# Patient Record
Sex: MALE | Race: WHITE | HISPANIC OR LATINO | Employment: FULL TIME | URBAN - METROPOLITAN AREA
[De-identification: names, ages, dates, MRNs, and addresses within clinical notes are randomized per-mention and may not be internally consistent; named-entity substitution may affect disease eponyms.]

---

## 2017-03-29 ENCOUNTER — ALLSCRIPTS OFFICE VISIT (OUTPATIENT)
Dept: OTHER | Facility: OTHER | Age: 57
End: 2017-03-29

## 2017-03-29 DIAGNOSIS — E88.09 OTHER DISORDERS OF PLASMA-PROTEIN METABOLISM, NOT ELSEWHERE CLASSIFIED: ICD-10-CM

## 2017-03-29 DIAGNOSIS — I10 ESSENTIAL (PRIMARY) HYPERTENSION: ICD-10-CM

## 2017-03-29 DIAGNOSIS — K58.9 IRRITABLE BOWEL SYNDROME WITHOUT DIARRHEA: ICD-10-CM

## 2017-03-29 DIAGNOSIS — R31.9 HEMATURIA: ICD-10-CM

## 2017-03-29 DIAGNOSIS — R94.5 ABNORMAL RESULTS OF LIVER FUNCTION STUDIES: ICD-10-CM

## 2017-03-29 DIAGNOSIS — K44.9 DIAPHRAGMATIC HERNIA WITHOUT OBSTRUCTION OR GANGRENE: ICD-10-CM

## 2017-03-29 DIAGNOSIS — F41.9 ANXIETY DISORDER: ICD-10-CM

## 2017-03-29 DIAGNOSIS — R74.8 ABNORMAL LEVELS OF OTHER SERUM ENZYMES: ICD-10-CM

## 2017-03-29 DIAGNOSIS — Z12.5 ENCOUNTER FOR SCREENING FOR MALIGNANT NEOPLASM OF PROSTATE: ICD-10-CM

## 2017-03-29 DIAGNOSIS — Z12.11 ENCOUNTER FOR SCREENING FOR MALIGNANT NEOPLASM OF COLON: ICD-10-CM

## 2018-01-12 VITALS
SYSTOLIC BLOOD PRESSURE: 134 MMHG | DIASTOLIC BLOOD PRESSURE: 88 MMHG | HEART RATE: 76 BPM | HEIGHT: 62 IN | WEIGHT: 174 LBS | BODY MASS INDEX: 32.02 KG/M2

## 2018-04-19 ENCOUNTER — OFFICE VISIT (OUTPATIENT)
Dept: FAMILY MEDICINE CLINIC | Facility: CLINIC | Age: 58
End: 2018-04-19
Payer: COMMERCIAL

## 2018-04-19 VITALS
DIASTOLIC BLOOD PRESSURE: 80 MMHG | WEIGHT: 170 LBS | HEIGHT: 62 IN | BODY MASS INDEX: 31.28 KG/M2 | HEART RATE: 72 BPM | SYSTOLIC BLOOD PRESSURE: 130 MMHG

## 2018-04-19 DIAGNOSIS — M25.511 CHRONIC RIGHT SHOULDER PAIN: ICD-10-CM

## 2018-04-19 DIAGNOSIS — I10 ESSENTIAL HYPERTENSION: Primary | ICD-10-CM

## 2018-04-19 DIAGNOSIS — R79.89 ELEVATED LIVER FUNCTION TESTS: ICD-10-CM

## 2018-04-19 DIAGNOSIS — G89.29 CHRONIC RIGHT SHOULDER PAIN: ICD-10-CM

## 2018-04-19 DIAGNOSIS — G47.00 INSOMNIA, UNSPECIFIED TYPE: ICD-10-CM

## 2018-04-19 DIAGNOSIS — Z12.11 SCREEN FOR COLON CANCER: ICD-10-CM

## 2018-04-19 DIAGNOSIS — Z12.5 SCREENING FOR PROSTATE CANCER: ICD-10-CM

## 2018-04-19 DIAGNOSIS — F41.9 ANXIETY: ICD-10-CM

## 2018-04-19 PROBLEM — M25.50 MULTIPLE JOINT PAIN: Status: ACTIVE | Noted: 2018-04-19

## 2018-04-19 PROBLEM — F32.A MILD DEPRESSION: Status: ACTIVE | Noted: 2017-03-29

## 2018-04-19 PROCEDURE — 93000 ELECTROCARDIOGRAM COMPLETE: CPT | Performed by: FAMILY MEDICINE

## 2018-04-19 PROCEDURE — 99214 OFFICE O/P EST MOD 30 MIN: CPT | Performed by: FAMILY MEDICINE

## 2018-04-19 RX ORDER — NAPROXEN 500 MG/1
500 TABLET ORAL 2 TIMES DAILY WITH MEALS
Qty: 30 TABLET | Refills: 1 | Status: SHIPPED | OUTPATIENT
Start: 2018-04-19 | End: 2018-06-18

## 2018-04-19 RX ORDER — HYDROXYZINE HYDROCHLORIDE 10 MG/1
TABLET, FILM COATED ORAL
Qty: 60 TABLET | Refills: 1 | Status: SHIPPED | OUTPATIENT
Start: 2018-04-19 | End: 2018-06-18

## 2018-04-19 RX ORDER — IBUPROFEN 200 MG
TABLET ORAL EVERY 6 HOURS PRN
COMMUNITY
End: 2018-04-19 | Stop reason: ALTCHOICE

## 2018-04-19 RX ORDER — LISINOPRIL AND HYDROCHLOROTHIAZIDE 20; 12.5 MG/1; MG/1
1 TABLET ORAL DAILY
COMMUNITY
Start: 2011-06-29 | End: 2018-04-19 | Stop reason: SDUPTHER

## 2018-04-19 RX ORDER — LISINOPRIL AND HYDROCHLOROTHIAZIDE 20; 12.5 MG/1; MG/1
1 TABLET ORAL DAILY
Qty: 90 TABLET | Refills: 1 | Status: SHIPPED | OUTPATIENT
Start: 2018-04-19 | End: 2020-01-31 | Stop reason: SDUPTHER

## 2018-04-19 NOTE — PROGRESS NOTES
Assessment/Plan:    No problem-specific Assessment & Plan notes found for this encounter  Diagnoses and all orders for this visit:    Essential hypertension  -     lisinopril-hydrochlorothiazide (PRINZIDE,ZESTORETIC) 20-12 5 MG per tablet; Take 1 tablet by mouth daily  -     CBC and differential; Future  -     Comprehensive metabolic panel; Future  -     Lipid Panel with Direct LDL reflex; Future  -     TSH, 3rd generation; Future  -     KAROLINA Screen w/ Reflex to Titer/Pattern; Future  -     Lyme Antibody Profile with reflex to WB; Future  -     RF Screen w/ Reflex to Titer; Future  -     Sedimentation rate, automated; Future  -     POCT ECG    Anxiety  -     CBC and differential; Future  -     Comprehensive metabolic panel; Future  -     Lipid Panel with Direct LDL reflex; Future  -     TSH, 3rd generation; Future  -     KAROLINA Screen w/ Reflex to Titer/Pattern; Future  -     Lyme Antibody Profile with reflex to WB; Future  -     RF Screen w/ Reflex to Titer; Future  -     Sedimentation rate, automated; Future    Elevated liver function tests  -     CBC and differential; Future  -     Comprehensive metabolic panel; Future  -     Lipid Panel with Direct LDL reflex; Future  -     TSH, 3rd generation; Future  -     KAROLINA Screen w/ Reflex to Titer/Pattern; Future  -     Lyme Antibody Profile with reflex to WB; Future  -     RF Screen w/ Reflex to Titer; Future  -     Sedimentation rate, automated; Future    Insomnia, unspecified type  -     CBC and differential; Future  -     Comprehensive metabolic panel; Future  -     Lipid Panel with Direct LDL reflex; Future  -     TSH, 3rd generation; Future  -     KAROLINA Screen w/ Reflex to Titer/Pattern; Future  -     Lyme Antibody Profile with reflex to WB; Future  -     RF Screen w/ Reflex to Titer; Future  -     Sedimentation rate, automated; Future  -     hydrOXYzine HCL (ATARAX) 10 mg tablet;  One or 2 tablets at bedtime    Chronic right shoulder pain  -     CBC and differential; Future  -     Comprehensive metabolic panel; Future  -     Lipid Panel with Direct LDL reflex; Future  -     TSH, 3rd generation; Future  -     KAROLINA Screen w/ Reflex to Titer/Pattern; Future  -     Lyme Antibody Profile with reflex to WB; Future  -     RF Screen w/ Reflex to Titer; Future  -     Sedimentation rate, automated; Future  -     POCT ECG  -     naproxen (NAPROSYN) 500 mg tablet; Take 1 tablet (500 mg total) by mouth 2 (two) times a day with meals    Screen for colon cancer  -     Occult Bloood,Fecal Immunochemical; Future    Screening for prostate cancer  -     PSA, total and free; Future    Other orders  -     Discontinue: lisinopril-hydrochlorothiazide (PRINZIDE,ZESTORETIC) 20-12 5 MG per tablet; Take 1 tablet by mouth daily  -     Discontinue: ibuprofen (MOTRIN) 200 mg tablet; Take by mouth every 6 (six) hours as needed for mild pain          Subjective:      Patient ID: Codi Castellano is a 56 y o  male  CC:  B / L  Shoulder pain - worse in left and pt is concerned that sometimes he has pain upper left side of chest   No SOB, no nausea  Symptoms have been present for 3 weeks  No specific injury  Pt also notes that he works nights and has trouble sleeping during the day  Previous sleep medications that were prescribed were no help  -  [facility]    HPI:  This is a 53-year-old male who comes in with symptoms of left shoulder/left upper chest pain for the past 3 days  The pain will came up from a sound sleep  Does not have any associated shortness of breath or radiation of the pain down the arm or to the jaw  The pain is intermittent and lasts approximately 2 seconds  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 and feels that the left chest/shoulder pain is the results of repetitive circular motion so of driving the forklift  He is also having some left hand pain which makes it difficult to grasp objects  His knees have been bothering him    He is also having some insomnia problems  He was given trazodone in the past and through the tablets away because he said they did not help the insomnia problem  His blood pressure is 130/80  He has not had lab work for 4 years and will be given lab requisitions today and brought back in several weeks to check the progress of his shoulder pain  The following portions of the patient's history were reviewed and updated as appropriate: allergies, current medications, past family history, past medical history, past social history, past surgical history and problem list     Review of Systems   Constitutional: Negative  HENT: Negative  Eyes: Negative  Respiratory: Negative  Cardiovascular: Negative  Gastrointestinal: Negative  Endocrine: Negative  Genitourinary: Negative  Musculoskeletal: Positive for arthralgias  Left upper chest/shoulder pain   Skin: Negative  Allergic/Immunologic: Negative  Neurological: Negative  Hematological: Negative  Psychiatric/Behavioral: Negative  Vitals:    04/19/18 1016   BP: 130/80   BP Location: Left arm   Patient Position: Sitting   Cuff Size: Large   Pulse: 72   Weight: 77 1 kg (170 lb)   Height: 5' 2" (1 575 m)   Objective:      /80 (BP Location: Left arm, Patient Position: Sitting, Cuff Size: Large)   Pulse 72   Ht 5' 2" (1 575 m)   Wt 77 1 kg (170 lb)   BMI 31 09 kg/m²          Physical Exam   Constitutional: He is oriented to person, place, and time  He appears well-developed and well-nourished  HENT:   Head: Normocephalic  Right Ear: External ear normal    Left Ear: External ear normal    Mouth/Throat: Oropharynx is clear and moist    Eyes: Conjunctivae and EOM are normal  Pupils are equal, round, and reactive to light  Neck: Normal range of motion  Neck supple  Cardiovascular: Normal rate, regular rhythm and normal heart sounds  Pulmonary/Chest: Effort normal and breath sounds normal    Abdominal: Soft   Bowel sounds are normal    Musculoskeletal: He exhibits tenderness  He exhibits no edema or deformity  Tenderness to palpation anterior aspect of left shoulder  No limitation of motion of arm, good  strength  Neurological: He is alert and oriented to person, place, and time  Skin: Skin is warm  Psychiatric: He has a normal mood and affect  His behavior is normal  Judgment and thought content normal    Nursing note and vitals reviewed

## 2018-04-23 LAB
ALBUMIN SERPL-MCNC: 4.3 G/DL (ref 3.6–5.1)
ALBUMIN/GLOB SERPL: 1.6 (CALC) (ref 1–2.5)
ALP SERPL-CCNC: 106 U/L (ref 40–115)
ALT SERPL-CCNC: 31 U/L (ref 9–46)
ANA PAT SER IF-IMP: ABNORMAL
ANA SER QL IF: POSITIVE
ANA TITR SER IF: ABNORMAL TITER
AST SERPL-CCNC: 23 U/L (ref 10–35)
B BURGDOR IGG SER QL IB: NEGATIVE
B BURGDOR IGM SER QL IB: NEGATIVE
B BURGDOR18KD IGG SER QL IB: ABNORMAL
B BURGDOR23KD IGG SER QL IB: ABNORMAL
B BURGDOR23KD IGM SER QL IB: ABNORMAL
B BURGDOR28KD IGG SER QL IB: ABNORMAL
B BURGDOR30KD IGG SER QL IB: ABNORMAL
B BURGDOR39KD IGG SER QL IB: ABNORMAL
B BURGDOR39KD IGM SER QL IB: ABNORMAL
B BURGDOR41KD IGG SER QL IB: REACTIVE
B BURGDOR41KD IGM SER QL IB: ABNORMAL
B BURGDOR45KD IGG SER QL IB: ABNORMAL
B BURGDOR58KD IGG SER QL IB: ABNORMAL
B BURGDOR66KD IGG SER QL IB: ABNORMAL
B BURGDOR93KD IGG SER QL IB: ABNORMAL
BASOPHILS # BLD AUTO: 20 CELLS/UL (ref 0–200)
BASOPHILS NFR BLD AUTO: 0.3 %
BILIRUB SERPL-MCNC: 0.9 MG/DL (ref 0.2–1.2)
BUN SERPL-MCNC: 19 MG/DL (ref 7–25)
BUN/CREAT SERPL: NORMAL (CALC) (ref 6–22)
CALCIUM SERPL-MCNC: 9.4 MG/DL (ref 8.6–10.3)
CHLORIDE SERPL-SCNC: 105 MMOL/L (ref 98–110)
CHOLEST SERPL-MCNC: 191 MG/DL
CHOLEST/HDLC SERPL: 4.5 (CALC)
CO2 SERPL-SCNC: 28 MMOL/L (ref 20–31)
CREAT SERPL-MCNC: 1.16 MG/DL (ref 0.7–1.33)
EOSINOPHIL # BLD AUTO: 79 CELLS/UL (ref 15–500)
EOSINOPHIL NFR BLD AUTO: 1.2 %
ERYTHROCYTE [DISTWIDTH] IN BLOOD BY AUTOMATED COUNT: 13.1 % (ref 11–15)
ERYTHROCYTE [SEDIMENTATION RATE] IN BLOOD BY WESTERGREN METHOD: 14 MM/H
GLOBULIN SER CALC-MCNC: 2.7 G/DL (CALC) (ref 1.9–3.7)
GLUCOSE SERPL-MCNC: 85 MG/DL (ref 65–99)
HCT VFR BLD AUTO: 41.7 % (ref 38.5–50)
HDLC SERPL-MCNC: 42 MG/DL
HGB BLD-MCNC: 14.7 G/DL (ref 13.2–17.1)
LDLC SERPL CALC-MCNC: 128 MG/DL (CALC)
LYMPHOCYTES # BLD AUTO: 1571 CELLS/UL (ref 850–3900)
LYMPHOCYTES NFR BLD AUTO: 23.8 %
MCH RBC QN AUTO: 34.3 PG (ref 27–33)
MCHC RBC AUTO-ENTMCNC: 35.3 G/DL (ref 32–36)
MCV RBC AUTO: 97.4 FL (ref 80–100)
MONOCYTES # BLD AUTO: 436 CELLS/UL (ref 200–950)
MONOCYTES NFR BLD AUTO: 6.6 %
NEUTROPHILS # BLD AUTO: 4495 CELLS/UL (ref 1500–7800)
NEUTROPHILS NFR BLD AUTO: 68.1 %
NONHDLC SERPL-MCNC: 149 MG/DL (CALC)
PLATELET # BLD AUTO: 267 THOUSAND/UL (ref 140–400)
PMV BLD REES-ECKER: 9.1 FL (ref 7.5–12.5)
POTASSIUM SERPL-SCNC: 4.3 MMOL/L (ref 3.5–5.3)
PROT SERPL-MCNC: 7 G/DL (ref 6.1–8.1)
PSA FREE MFR SERPL: 33 % (CALC)
PSA FREE SERPL-MCNC: 0.2 NG/ML
PSA SERPL-MCNC: 0.6 NG/ML
RBC # BLD AUTO: 4.28 MILLION/UL (ref 4.2–5.8)
RHEUMATOID FACT SERPL-ACNC: <14 IU/ML
SL AMB EGFR AFRICAN AMERICAN: 82 ML/MIN/1.73M2
SL AMB EGFR NON AFRICAN AMERICAN: 70 ML/MIN/1.73M2
SODIUM SERPL-SCNC: 140 MMOL/L (ref 135–146)
TRIGL SERPL-MCNC: 105 MG/DL
TSH SERPL-ACNC: 4.01 MIU/L (ref 0.4–4.5)
WBC # BLD AUTO: 6.6 THOUSAND/UL (ref 3.8–10.8)

## 2018-06-18 ENCOUNTER — OFFICE VISIT (OUTPATIENT)
Dept: FAMILY MEDICINE CLINIC | Facility: CLINIC | Age: 58
End: 2018-06-18
Payer: COMMERCIAL

## 2018-06-18 VITALS
HEART RATE: 78 BPM | BODY MASS INDEX: 31.65 KG/M2 | SYSTOLIC BLOOD PRESSURE: 130 MMHG | WEIGHT: 172 LBS | HEIGHT: 62 IN | DIASTOLIC BLOOD PRESSURE: 68 MMHG

## 2018-06-18 DIAGNOSIS — M25.50 MULTIPLE JOINT PAIN: ICD-10-CM

## 2018-06-18 DIAGNOSIS — R76.8 POSITIVE ANA (ANTINUCLEAR ANTIBODY): ICD-10-CM

## 2018-06-18 DIAGNOSIS — M25.511 CHRONIC RIGHT SHOULDER PAIN: ICD-10-CM

## 2018-06-18 DIAGNOSIS — E78.2 MIXED HYPERLIPIDEMIA: Primary | ICD-10-CM

## 2018-06-18 DIAGNOSIS — R19.5 HEME POSITIVE STOOL: ICD-10-CM

## 2018-06-18 DIAGNOSIS — I10 ESSENTIAL HYPERTENSION: ICD-10-CM

## 2018-06-18 DIAGNOSIS — G47.00 INSOMNIA, UNSPECIFIED TYPE: ICD-10-CM

## 2018-06-18 DIAGNOSIS — G89.29 CHRONIC RIGHT SHOULDER PAIN: ICD-10-CM

## 2018-06-18 PROCEDURE — 3008F BODY MASS INDEX DOCD: CPT | Performed by: FAMILY MEDICINE

## 2018-06-18 PROCEDURE — 3075F SYST BP GE 130 - 139MM HG: CPT | Performed by: FAMILY MEDICINE

## 2018-06-18 PROCEDURE — 99214 OFFICE O/P EST MOD 30 MIN: CPT | Performed by: FAMILY MEDICINE

## 2018-06-18 PROCEDURE — 3078F DIAST BP <80 MM HG: CPT | Performed by: FAMILY MEDICINE

## 2018-06-18 PROCEDURE — 1036F TOBACCO NON-USER: CPT | Performed by: FAMILY MEDICINE

## 2018-06-18 RX ORDER — ATORVASTATIN CALCIUM 10 MG/1
10 TABLET, FILM COATED ORAL DAILY
Qty: 90 TABLET | Refills: 3 | Status: SHIPPED | OUTPATIENT
Start: 2018-06-18 | End: 2020-02-06 | Stop reason: SDUPTHER

## 2018-06-18 NOTE — ASSESSMENT & PLAN NOTE
Patient took 1 dose of hydroxyzine and got a headache so therefore he did not take any more  Melatonin 10 mg 1 HS was prescribed

## 2018-06-18 NOTE — ASSESSMENT & PLAN NOTE
The patient's KAROLINA was positive  His rheumatoid factor was negative, Lyme titer was negative and his sed rate was within normal limits    He will be sent to Rheumatology for consult and

## 2018-06-18 NOTE — ASSESSMENT & PLAN NOTE
His blood pressure is stable at 1 30/68 and he currently takes lisinopril-hydrochlorothiazide 20-12 5 mg 1 daily

## 2018-06-18 NOTE — ASSESSMENT & PLAN NOTE
He had a heme-positive stool in the past and was referred to Dr Jeramy Cota for further evaluation but he did not show for that consult  Hemoccults were ordered again this time

## 2018-06-18 NOTE — PROGRESS NOTES
Assessment/Plan:    Hypertension  His blood pressure is stable at 1 30/68 and he currently takes lisinopril-hydrochlorothiazide 20-12 5 mg 1 daily  Mixed hyperlipidemia  The patient's LDL came down from 144 to 128 and he will be placed on atorvastatin 10 mg 1 daily  Multiple joint pain  The patient's KAROLINA was positive  His rheumatoid factor was negative, Lyme titer was negative and his sed rate was within normal limits  He will be sent to Rheumatology for consult and    Positive KAROLINA (antinuclear antibody)  To Rheumatology for further consult and evaluation    Right shoulder pain  Right shoulder pain comes and goes and is intermittent for the past several years  Heme positive stool  He had a heme-positive stool in the past and was referred to Dr Jeb Evans for further evaluation but he did not show for that consult  Hemoccults were ordered again this time  Insomnia  Patient took 1 dose of hydroxyzine and got a headache so therefore he did not take any more  Melatonin 10 mg 1 HS was prescribed  Diagnoses and all orders for this visit:    Mixed hyperlipidemia  -     atorvastatin (LIPITOR) 10 mg tablet; Take 1 tablet (10 mg total) by mouth daily  -     Comprehensive metabolic panel; Future  -     LDL cholesterol, direct; Future    Essential hypertension  -     Comprehensive metabolic panel; Future  -     LDL cholesterol, direct; Future    Multiple joint pain  -     Comprehensive metabolic panel; Future  -     LDL cholesterol, direct; Future  -     Ambulatory referral to Rheumatology; Future    Chronic right shoulder pain  -     Comprehensive metabolic panel; Future  -     LDL cholesterol, direct; Future  -     Ambulatory referral to Rheumatology; Future    Heme positive stool  -     Comprehensive metabolic panel; Future  -     LDL cholesterol, direct; Future  -     Occult Bloood,Fecal Immunochemical; Future    Insomnia, unspecified type  -     Comprehensive metabolic panel;  Future  -     LDL cholesterol, direct; Future    Positive KAROLINA (antinuclear antibody)  -     Ambulatory referral to Rheumatology; Future          Subjective: pt liliere for follow up of hypertension and insomnia  He states that the atarax is not working, and he wakes up with a headache, therefor, he stopped taking it~cd     Patient ID: Maryan Mcmahon is a 56 y o  male  This is a 42-year-old male who comes in for a review of his April blood work  He has not been very compliant in being seen and keeping his appointments  He was sent a letter stating that he needs to keep his appointments or he will be discharged from the practice  He understands this as we were talking about this today  Reviewing his April labs his KAROLINA is positive and he will be sent to Rheumatology for further evaluation and treatment  His glucose was 85, CBC normal, rheumatoid factor negative, Lyme titer negative TSH negative  His LDL is elevated at 128 but it is currently down from 144  He will be started on a atorvastatin 10 mg daily  His PSA is within normal limits  In the past he had a positive Hemoccult but did nothing about it  Hemoccults were ordered again for his next visit in 3 months  His blood pressure is 130/68 and his weight is up 2 lb from the previous visit to 172 lb  The following portions of the patient's history were reviewed and updated as appropriate: allergies, current medications, past family history, past medical history, past social history, past surgical history and problem list     Review of Systems   Constitutional: Negative  HENT: Negative  Eyes: Negative  Respiratory: Negative  Cardiovascular: Negative  Gastrointestinal: Negative  Endocrine: Negative  Genitourinary: Negative  Musculoskeletal: Negative  Skin: Negative  Allergic/Immunologic: Negative  Neurological: Negative  Hematological: Negative  Psychiatric/Behavioral: Negative            Objective:    Vitals:    06/18/18 1329   BP: 130/68   BP Location: Left arm   Patient Position: Sitting   Cuff Size: Large   Pulse: 78   Weight: 78 kg (172 lb)   Height: 5' 2" (1 575 m)              Physical Exam   Constitutional: He is oriented to person, place, and time  He appears well-developed and well-nourished  HENT:   Head: Normocephalic  Right Ear: External ear normal    Left Ear: External ear normal    Mouth/Throat: Oropharynx is clear and moist    Eyes: Conjunctivae and EOM are normal  Pupils are equal, round, and reactive to light  Neck: Normal range of motion  Neck supple  Cardiovascular: Normal rate, regular rhythm and normal heart sounds  Pulmonary/Chest: Effort normal and breath sounds normal    Abdominal: Soft  Bowel sounds are normal    Musculoskeletal: Normal range of motion  Neurological: He is alert and oriented to person, place, and time  Skin: Skin is warm  Psychiatric: He has a normal mood and affect   His behavior is normal  Judgment and thought content normal

## 2018-07-13 LAB
ALBUMIN SERPL-MCNC: 4.2 G/DL (ref 3.6–5.1)
ALBUMIN/GLOB SERPL: 1.5 (CALC) (ref 1–2.5)
ALP SERPL-CCNC: 90 U/L (ref 40–115)
ALT SERPL-CCNC: 27 U/L (ref 9–46)
AST SERPL-CCNC: 19 U/L (ref 10–35)
BILIRUB SERPL-MCNC: 0.8 MG/DL (ref 0.2–1.2)
BUN SERPL-MCNC: 23 MG/DL (ref 7–25)
BUN/CREAT SERPL: NORMAL (CALC) (ref 6–22)
CALCIUM SERPL-MCNC: 9.2 MG/DL (ref 8.6–10.3)
CHLORIDE SERPL-SCNC: 106 MMOL/L (ref 98–110)
CO2 SERPL-SCNC: 29 MMOL/L (ref 20–31)
CREAT SERPL-MCNC: 1.12 MG/DL (ref 0.7–1.33)
GLOBULIN SER CALC-MCNC: 2.8 G/DL (CALC) (ref 1.9–3.7)
GLUCOSE SERPL-MCNC: 92 MG/DL (ref 65–99)
LDLC SERPL DIRECT ASSAY-MCNC: 115 MG/DL
POTASSIUM SERPL-SCNC: 4.5 MMOL/L (ref 3.5–5.3)
PROT SERPL-MCNC: 7 G/DL (ref 6.1–8.1)
SL AMB EGFR AFRICAN AMERICAN: 85 ML/MIN/1.73M2
SL AMB EGFR NON AFRICAN AMERICAN: 74 ML/MIN/1.73M2
SODIUM SERPL-SCNC: 140 MMOL/L (ref 135–146)

## 2019-04-22 DIAGNOSIS — I10 ESSENTIAL HYPERTENSION: ICD-10-CM

## 2019-04-22 RX ORDER — LISINOPRIL AND HYDROCHLOROTHIAZIDE 20; 12.5 MG/1; MG/1
1 TABLET ORAL DAILY
Qty: 90 TABLET | Refills: 1 | OUTPATIENT
Start: 2019-04-22

## 2020-01-31 DIAGNOSIS — I10 ESSENTIAL HYPERTENSION: ICD-10-CM

## 2020-01-31 DIAGNOSIS — E78.2 MIXED HYPERLIPIDEMIA: ICD-10-CM

## 2020-01-31 NOTE — TELEPHONE ENCOUNTER
PT LM ON REFERRAL LINE, HE IS ASKING TO HAVE A MED REFILL BUT DID NOT SAY WHAT MED    PT CAN BE REACHED [phone]

## 2020-02-01 RX ORDER — LISINOPRIL AND HYDROCHLOROTHIAZIDE 20; 12.5 MG/1; MG/1
1 TABLET ORAL DAILY
Qty: 10 TABLET | Refills: 0 | Status: SHIPPED | OUTPATIENT
Start: 2020-02-01 | End: 2020-02-06 | Stop reason: SDUPTHER

## 2020-02-01 RX ORDER — LISINOPRIL AND HYDROCHLOROTHIAZIDE 20; 12.5 MG/1; MG/1
1 TABLET ORAL DAILY
Qty: 90 TABLET | Refills: 0 | Status: SHIPPED | OUTPATIENT
Start: 2020-02-01 | End: 2020-02-06 | Stop reason: SDUPTHER

## 2020-02-06 ENCOUNTER — OFFICE VISIT (OUTPATIENT)
Dept: FAMILY MEDICINE CLINIC | Facility: CLINIC | Age: 60
End: 2020-02-06
Payer: COMMERCIAL

## 2020-02-06 VITALS
HEART RATE: 64 BPM | WEIGHT: 176 LBS | SYSTOLIC BLOOD PRESSURE: 152 MMHG | DIASTOLIC BLOOD PRESSURE: 98 MMHG | HEIGHT: 62 IN | BODY MASS INDEX: 32.39 KG/M2

## 2020-02-06 DIAGNOSIS — R74.8 ELEVATED ALKALINE PHOSPHATASE LEVEL: ICD-10-CM

## 2020-02-06 DIAGNOSIS — Z12.11 SCREEN FOR COLON CANCER: ICD-10-CM

## 2020-02-06 DIAGNOSIS — F41.9 ANXIETY: Primary | ICD-10-CM

## 2020-02-06 DIAGNOSIS — E78.2 MIXED HYPERLIPIDEMIA: ICD-10-CM

## 2020-02-06 DIAGNOSIS — R79.89 ELEVATED LIVER FUNCTION TESTS: ICD-10-CM

## 2020-02-06 DIAGNOSIS — R01.1 SYSTOLIC MURMUR: ICD-10-CM

## 2020-02-06 DIAGNOSIS — R19.5 HEME POSITIVE STOOL: ICD-10-CM

## 2020-02-06 DIAGNOSIS — I10 ESSENTIAL HYPERTENSION: ICD-10-CM

## 2020-02-06 DIAGNOSIS — R31.9 HEMATURIA, UNSPECIFIED TYPE: ICD-10-CM

## 2020-02-06 DIAGNOSIS — R76.8 POSITIVE ANA (ANTINUCLEAR ANTIBODY): ICD-10-CM

## 2020-02-06 DIAGNOSIS — Z12.5 SCREENING FOR PROSTATE CANCER: ICD-10-CM

## 2020-02-06 DIAGNOSIS — B35.4 TINEA CORPORIS: ICD-10-CM

## 2020-02-06 DIAGNOSIS — M25.50 MULTIPLE JOINT PAIN: ICD-10-CM

## 2020-02-06 DIAGNOSIS — G47.00 INSOMNIA, UNSPECIFIED TYPE: ICD-10-CM

## 2020-02-06 PROCEDURE — 3080F DIAST BP >= 90 MM HG: CPT | Performed by: FAMILY MEDICINE

## 2020-02-06 PROCEDURE — 3008F BODY MASS INDEX DOCD: CPT | Performed by: FAMILY MEDICINE

## 2020-02-06 PROCEDURE — 99215 OFFICE O/P EST HI 40 MIN: CPT | Performed by: FAMILY MEDICINE

## 2020-02-06 PROCEDURE — 1036F TOBACCO NON-USER: CPT | Performed by: FAMILY MEDICINE

## 2020-02-06 PROCEDURE — 3077F SYST BP >= 140 MM HG: CPT | Performed by: FAMILY MEDICINE

## 2020-02-06 RX ORDER — ATORVASTATIN CALCIUM 20 MG/1
20 TABLET, FILM COATED ORAL DAILY
Qty: 30 TABLET | Refills: 1 | Status: SHIPPED | OUTPATIENT
Start: 2020-02-06 | End: 2020-02-18 | Stop reason: SDUPTHER

## 2020-02-06 RX ORDER — HYDROXYZINE HYDROCHLORIDE 10 MG/1
10 TABLET, FILM COATED ORAL
Qty: 30 TABLET | Refills: 1 | Status: SHIPPED | OUTPATIENT
Start: 2020-02-06 | End: 2020-04-04

## 2020-02-06 RX ORDER — CLOTRIMAZOLE AND BETAMETHASONE DIPROPIONATE 10; .64 MG/G; MG/G
CREAM TOPICAL 2 TIMES DAILY
Qty: 30 G | Refills: 0 | Status: SHIPPED | OUTPATIENT
Start: 2020-02-06 | End: 2021-03-26 | Stop reason: SDUPTHER

## 2020-02-06 RX ORDER — LISINOPRIL AND HYDROCHLOROTHIAZIDE 20; 12.5 MG/1; MG/1
1 TABLET ORAL DAILY
Qty: 30 TABLET | Refills: 1 | Status: SHIPPED | OUTPATIENT
Start: 2020-02-06 | End: 2020-04-04

## 2020-02-06 NOTE — PROGRESS NOTES
Assessment and Plan:  The patient was given lab requisitions to get done and a return visit will be made for 2 weeks  The patient is very non compliant and was told that if he does not follow through with the labs and his referrals he will be discharged from the practice  Problem List Items Addressed This Visit        Cardiovascular and Mediastinum    Hypertension     His blood pressure is elevated at 152/98  He has been out of his blood pressure medication for at least a month  His blood pressure medication was renewed for 2 months because he is to return in 2 weeks to review labs and if he is given more refills he will not come back to the office  Relevant Medications    lisinopril-hydrochlorothiazide (PRINZIDE,ZESTORETIC) 20-12 5 MG per tablet    Other Relevant Orders    CBC and differential    Comprehensive metabolic panel    Lipid Panel with Direct LDL reflex    TSH, 3rd generation    UA w Reflex to Microscopic w Reflex to Culture    RF Screen w/ Reflex to Titer    Sedimentation rate, automated    Uric acid       Musculoskeletal and Integument    Tinea corporis     Lotrisone cream was ordered for his rash which she has had in the past             Other    Anxiety - Primary     The patient's anxiety is stable at the present time  He is on no medication for the anxiety  Relevant Orders    CBC and differential    Comprehensive metabolic panel    Lipid Panel with Direct LDL reflex    TSH, 3rd generation    UA w Reflex to Microscopic w Reflex to Culture    RF Screen w/ Reflex to Titer    Sedimentation rate, automated    Uric acid    Elevated alkaline phosphatase level     Labs are ordered and will be reviewed in 2 weeks           Relevant Orders    CBC and differential    Comprehensive metabolic panel    Lipid Panel with Direct LDL reflex    TSH, 3rd generation    UA w Reflex to Microscopic w Reflex to Culture    RF Screen w/ Reflex to Titer    Sedimentation rate, automated    Uric acid Elevated liver function tests     Labs are ordered and will be reviewed in 2 weeks  Relevant Orders    CBC and differential    Comprehensive metabolic panel    Lipid Panel with Direct LDL reflex    TSH, 3rd generation    UA w Reflex to Microscopic w Reflex to Culture    RF Screen w/ Reflex to Titer    Sedimentation rate, automated    Uric acid    Hematuria     He has had blood in his urine in the past and did not follow-up with this problem  A urine is ordered with his labs and will be reviewed in 2 weeks  Heme positive stool     He had blood in his stool from hemoccults and ignored the consult to Gastroenterology to get a colonoscopy  He was given another referral to get a colonoscopy now  Relevant Orders    Ambulatory referral to Gastroenterology    Insomnia     He was placed on hydroxyzine 10 mg 1 HS for sleep  Relevant Medications    clotrimazole-betamethasone (LOTRISONE) 1-0 05 % cream    hydrOXYzine HCL (ATARAX) 10 mg tablet    Screen for colon cancer     Hemoccults are ordered as well as a colonoscopy with Dr Lidia Baldwin  Relevant Orders    CBC and differential    Comprehensive metabolic panel    Lipid Panel with Direct LDL reflex    TSH, 3rd generation    Occult Blood, Fecal Immunochemical    UA w Reflex to Microscopic w Reflex to Culture    RF Screen w/ Reflex to Titer    Sedimentation rate, automated    Uric acid    Screening for prostate cancer     A PSA is ordered with his labs  Relevant Orders    CBC and differential    Comprehensive metabolic panel    Lipid Panel with Direct LDL reflex    TSH, 3rd generation    PSA, Total Screen    UA w Reflex to Microscopic w Reflex to Culture    RF Screen w/ Reflex to Titer    Sedimentation rate, automated    Uric acid    Multiple joint pain     Rheumatology consult  He has a positive KAROLINA and he did not see Rheumatology as requested in 2018            Mixed hyperlipidemia     His cholesterol numbers from his labs done at work are as follows: Total cholesterol 296, triglycerides 173, HDL 49,   He has been out of his atorvastatin for at least a month  Relevant Medications    atorvastatin (LIPITOR) 20 mg tablet    Other Relevant Orders    CBC and differential    Comprehensive metabolic panel    Lipid Panel with Direct LDL reflex    TSH, 3rd generation    UA w Reflex to Microscopic w Reflex to Culture    RF Screen w/ Reflex to Titer    Sedimentation rate, automated    Uric acid    Positive KAROLINA (antinuclear antibody)     Another rheumatology consult was given to him in the hopes that he keeps his appointment this time  Relevant Orders    CBC and differential    Comprehensive metabolic panel    Lipid Panel with Direct LDL reflex    TSH, 3rd generation    UA w Reflex to Microscopic w Reflex to Culture    RF Screen w/ Reflex to Titer    Sedimentation rate, automated    Uric acid    Ambulatory referral to Rheumatology    Systolic murmur     An echocardiogram is ordered  Relevant Orders    Echo complete with contrast if indicated                 Diagnoses and all orders for this visit:    Anxiety  -     CBC and differential; Future  -     Comprehensive metabolic panel; Future  -     Lipid Panel with Direct LDL reflex; Future  -     TSH, 3rd generation; Future  -     UA w Reflex to Microscopic w Reflex to Culture; Future  -     RF Screen w/ Reflex to Titer; Future  -     Sedimentation rate, automated; Future  -     Uric acid; Future    Elevated alkaline phosphatase level  -     CBC and differential; Future  -     Comprehensive metabolic panel; Future  -     Lipid Panel with Direct LDL reflex; Future  -     TSH, 3rd generation; Future  -     UA w Reflex to Microscopic w Reflex to Culture; Future  -     RF Screen w/ Reflex to Titer; Future  -     Sedimentation rate, automated; Future  -     Uric acid;  Future    Elevated liver function tests  -     CBC and differential; Future  -     Comprehensive metabolic panel; Future  -     Lipid Panel with Direct LDL reflex; Future  -     TSH, 3rd generation; Future  -     UA w Reflex to Microscopic w Reflex to Culture; Future  -     RF Screen w/ Reflex to Titer; Future  -     Sedimentation rate, automated; Future  -     Uric acid; Future    Essential hypertension  -     CBC and differential; Future  -     Comprehensive metabolic panel; Future  -     Lipid Panel with Direct LDL reflex; Future  -     TSH, 3rd generation; Future  -     UA w Reflex to Microscopic w Reflex to Culture; Future  -     RF Screen w/ Reflex to Titer; Future  -     Sedimentation rate, automated; Future  -     Uric acid; Future  -     lisinopril-hydrochlorothiazide (PRINZIDE,ZESTORETIC) 20-12 5 MG per tablet; Take 1 tablet by mouth daily    Mixed hyperlipidemia  -     CBC and differential; Future  -     Comprehensive metabolic panel; Future  -     Lipid Panel with Direct LDL reflex; Future  -     TSH, 3rd generation; Future  -     UA w Reflex to Microscopic w Reflex to Culture; Future  -     RF Screen w/ Reflex to Titer; Future  -     Sedimentation rate, automated; Future  -     Uric acid; Future  -     atorvastatin (LIPITOR) 20 mg tablet; Take 1 tablet (20 mg total) by mouth daily    Positive KAROLINA (antinuclear antibody)  -     CBC and differential; Future  -     Comprehensive metabolic panel; Future  -     Lipid Panel with Direct LDL reflex; Future  -     TSH, 3rd generation; Future  -     UA w Reflex to Microscopic w Reflex to Culture; Future  -     RF Screen w/ Reflex to Titer; Future  -     Sedimentation rate, automated; Future  -     Uric acid; Future  -     Ambulatory referral to Rheumatology; Future    Screen for colon cancer  -     CBC and differential; Future  -     Comprehensive metabolic panel; Future  -     Lipid Panel with Direct LDL reflex; Future  -     TSH, 3rd generation; Future  -     Occult Blood, Fecal Immunochemical; Future  -     UA w Reflex to Microscopic w Reflex to Culture;  Future  -     RF Screen w/ Reflex to Titer; Future  -     Sedimentation rate, automated; Future  -     Uric acid; Future    Screening for prostate cancer  -     CBC and differential; Future  -     Comprehensive metabolic panel; Future  -     Lipid Panel with Direct LDL reflex; Future  -     TSH, 3rd generation; Future  -     PSA, Total Screen; Future  -     UA w Reflex to Microscopic w Reflex to Culture; Future  -     RF Screen w/ Reflex to Titer; Future  -     Sedimentation rate, automated; Future  -     Uric acid; Future    Heme positive stool  -     Ambulatory referral to Gastroenterology; Future    Systolic murmur  -     Echo complete with contrast if indicated; Future    Insomnia, unspecified type  -     clotrimazole-betamethasone (LOTRISONE) 1-0 05 % cream; Apply topically 2 (two) times a day  -     hydrOXYzine HCL (ATARAX) 10 mg tablet; Take 1 tablet (10 mg total) by mouth daily at bedtime    Tinea corporis    Hematuria, unspecified type    Multiple joint pain              Subjective:      Patient ID: Celi Polanco is a 64 y o  male  CC:    Chief Complaint   Patient presents with    Hypertension     Pt states he has run out of all his medications  He has not taken meds in 1 month  Flu immunization declined  -  lsh    Headache     Pt notes he gets frequent headaches   Insomnia     Difficulty sleeping - requests medication  - lsh       HPI:    This is a 61-year-old male who comes in because he has been out of his blood pressure pill for at least a month  He has not been seen since 2018 and has not been compliant  At that time he had blood in his stool and was set up to see GI for a colonoscopy and he did not go  He also was set up to see Rheumatology because his KAROLINA was positive and again he did not go to the rheumatologist   His blood pressures at work have been high with readings of 179/108, 160/98 and 155/90  He has been out of his medicine for over a month    His work did a cholesterol on him and his total cholesterol was 296, triglycerides 173, HDL 49 and   He also has not been taking his atorvastatin  His blood pressure today is 152/98 and his weight is 176 lb up 4 lb from the previous visit and his BMI is 32 9  He will be given lab requisitions to get the labs done in 2 weeks and return for a blood pressure check and a review of the labs  He will be given a referral to get a colonoscopy because of his positive hemoccults in the past and a referral to rheumatology for his positive KAROLINA  He is also complaining of the recurring tinea rash in his groin and his inability to sleep  The following portions of the patient's history were reviewed and updated as appropriate: allergies, current medications, past family history, past medical history, past social history, past surgical history and problem list       Review of Systems   Constitutional: Negative  HENT: Negative  Eyes: Negative  Respiratory: Negative  Cardiovascular: Negative  Gastrointestinal: Negative  Endocrine: Negative  Genitourinary: Negative  Musculoskeletal: Negative  Skin: Positive for rash  Rash in groin very pruritic   Allergic/Immunologic: Negative  Neurological: Negative  Hematological: Negative  Psychiatric/Behavioral: Negative  Data to review:       Objective:    Vitals:    02/06/20 0835   BP: 152/98   BP Location: Left arm   Patient Position: Sitting   Cuff Size: Large   Pulse: 64   Weight: 79 8 kg (176 lb)   Height: 5' 2" (1 575 m)        Physical Exam   Constitutional: He is oriented to person, place, and time  He appears well-developed and well-nourished  HENT:   Head: Normocephalic  Right Ear: External ear normal    Left Ear: External ear normal    Mouth/Throat: Oropharynx is clear and moist    Eyes: Pupils are equal, round, and reactive to light  Conjunctivae and EOM are normal    Neck: Normal range of motion  Neck supple  Cardiovascular: Normal rate and regular rhythm  New onset systolic murmur   Pulmonary/Chest: Effort normal and breath sounds normal    Abdominal: Soft  Bowel sounds are normal    Musculoskeletal: Normal range of motion  Neurological: He is alert and oriented to person, place, and time  Skin: Skin is warm  Erythematous rash in groin, macular in type   Psychiatric: He has a normal mood and affect  His behavior is normal  Judgment and thought content normal    Nursing note and vitals reviewed  BMI Counseling: Body mass index is 32 19 kg/m²  The BMI is above normal  Nutrition recommendations include decreasing portion sizes, encouraging healthy choices of fruits and vegetables, decreasing fast food intake, consuming healthier snacks, limiting drinks that contain sugar, moderation in carbohydrate intake, increasing intake of lean protein, reducing intake of saturated and trans fat and reducing intake of cholesterol  Exercise recommendations include exercising 3-5 times per week  No pharmacotherapy was ordered

## 2020-02-06 NOTE — ASSESSMENT & PLAN NOTE
Rheumatology consult  He has a positive KAROLINA and he did not see Rheumatology as requested in 2018

## 2020-02-06 NOTE — ASSESSMENT & PLAN NOTE
His blood pressure is elevated at 152/98  He has been out of his blood pressure medication for at least a month  His blood pressure medication was renewed for 2 months because he is to return in 2 weeks to review labs and if he is given more refills he will not come back to the office

## 2020-02-06 NOTE — ASSESSMENT & PLAN NOTE
He has had blood in his urine in the past and did not follow-up with this problem  A urine is ordered with his labs and will be reviewed in 2 weeks

## 2020-02-06 NOTE — ASSESSMENT & PLAN NOTE
He had blood in his stool from hemoccults and ignored the consult to Gastroenterology to get a colonoscopy  He was given another referral to get a colonoscopy now

## 2020-02-06 NOTE — ASSESSMENT & PLAN NOTE
His cholesterol numbers from his labs done at work are as follows: Total cholesterol 296, triglycerides 173, HDL 49,   He has been out of his atorvastatin for at least a month

## 2020-02-11 ENCOUNTER — APPOINTMENT (OUTPATIENT)
Dept: LAB | Facility: HOSPITAL | Age: 60
End: 2020-02-11
Payer: COMMERCIAL

## 2020-02-11 ENCOUNTER — TRANSCRIBE ORDERS (OUTPATIENT)
Dept: LAB | Facility: HOSPITAL | Age: 60
End: 2020-02-11

## 2020-02-11 DIAGNOSIS — I10 ESSENTIAL HYPERTENSION: ICD-10-CM

## 2020-02-11 DIAGNOSIS — R76.8 POSITIVE ANA (ANTINUCLEAR ANTIBODY): ICD-10-CM

## 2020-02-11 DIAGNOSIS — Z12.5 SCREENING FOR PROSTATE CANCER: ICD-10-CM

## 2020-02-11 DIAGNOSIS — F41.9 ANXIETY: ICD-10-CM

## 2020-02-11 DIAGNOSIS — Z12.11 SCREEN FOR COLON CANCER: Primary | ICD-10-CM

## 2020-02-11 DIAGNOSIS — R74.8 ELEVATED ALKALINE PHOSPHATASE LEVEL: ICD-10-CM

## 2020-02-11 DIAGNOSIS — R79.89 ELEVATED LIVER FUNCTION TESTS: ICD-10-CM

## 2020-02-11 DIAGNOSIS — E78.2 MIXED HYPERLIPIDEMIA: ICD-10-CM

## 2020-02-11 DIAGNOSIS — Z12.11 SCREEN FOR COLON CANCER: ICD-10-CM

## 2020-02-11 LAB
ALBUMIN SERPL BCP-MCNC: 4.1 G/DL (ref 3.5–5)
ALP SERPL-CCNC: 104 U/L (ref 46–116)
ALT SERPL W P-5'-P-CCNC: 61 U/L (ref 12–78)
ANION GAP SERPL CALCULATED.3IONS-SCNC: 9 MMOL/L (ref 4–13)
AST SERPL W P-5'-P-CCNC: 31 U/L (ref 5–45)
BACTERIA UR QL AUTO: ABNORMAL /HPF
BASOPHILS # BLD AUTO: 0.02 THOUSANDS/ΜL (ref 0–0.1)
BASOPHILS NFR BLD AUTO: 0 % (ref 0–1)
BILIRUB SERPL-MCNC: 0.92 MG/DL (ref 0.2–1)
BILIRUB UR QL STRIP: NEGATIVE
BUN SERPL-MCNC: 20 MG/DL (ref 5–25)
CALCIUM SERPL-MCNC: 9 MG/DL (ref 8.3–10.1)
CHLORIDE SERPL-SCNC: 103 MMOL/L (ref 100–108)
CHOLEST SERPL-MCNC: 207 MG/DL (ref 50–200)
CLARITY UR: ABNORMAL
CO2 SERPL-SCNC: 27 MMOL/L (ref 21–32)
COLOR UR: YELLOW
CREAT SERPL-MCNC: 1.24 MG/DL (ref 0.6–1.3)
EOSINOPHIL # BLD AUTO: 0.09 THOUSAND/ΜL (ref 0–0.61)
EOSINOPHIL NFR BLD AUTO: 2 % (ref 0–6)
ERYTHROCYTE [DISTWIDTH] IN BLOOD BY AUTOMATED COUNT: 12.2 % (ref 11.6–15.1)
ERYTHROCYTE [SEDIMENTATION RATE] IN BLOOD: 11 MM/HOUR (ref 0–10)
GFR SERPL CREATININE-BSD FRML MDRD: 64 ML/MIN/1.73SQ M
GLUCOSE P FAST SERPL-MCNC: 111 MG/DL (ref 65–99)
GLUCOSE UR STRIP-MCNC: NEGATIVE MG/DL
HCT VFR BLD AUTO: 48.3 % (ref 36.5–49.3)
HDLC SERPL-MCNC: 43 MG/DL
HGB BLD-MCNC: 16.4 G/DL (ref 12–17)
HGB UR QL STRIP.AUTO: ABNORMAL
IMM GRANULOCYTES # BLD AUTO: 0.02 THOUSAND/UL (ref 0–0.2)
IMM GRANULOCYTES NFR BLD AUTO: 0 % (ref 0–2)
KETONES UR STRIP-MCNC: NEGATIVE MG/DL
LDLC SERPL CALC-MCNC: 142 MG/DL (ref 0–100)
LEUKOCYTE ESTERASE UR QL STRIP: NEGATIVE
LYMPHOCYTES # BLD AUTO: 1.99 THOUSANDS/ΜL (ref 0.6–4.47)
LYMPHOCYTES NFR BLD AUTO: 34 % (ref 14–44)
MCH RBC QN AUTO: 34.4 PG (ref 26.8–34.3)
MCHC RBC AUTO-ENTMCNC: 34 G/DL (ref 31.4–37.4)
MCV RBC AUTO: 101 FL (ref 82–98)
MONOCYTES # BLD AUTO: 0.43 THOUSAND/ΜL (ref 0.17–1.22)
MONOCYTES NFR BLD AUTO: 7 % (ref 4–12)
NEUTROPHILS # BLD AUTO: 3.25 THOUSANDS/ΜL (ref 1.85–7.62)
NEUTS SEG NFR BLD AUTO: 57 % (ref 43–75)
NITRITE UR QL STRIP: NEGATIVE
NON-SQ EPI CELLS URNS QL MICRO: ABNORMAL /HPF
NRBC BLD AUTO-RTO: 0 /100 WBCS
PH UR STRIP.AUTO: 5 [PH]
PLATELET # BLD AUTO: 249 THOUSANDS/UL (ref 149–390)
PMV BLD AUTO: 8.8 FL (ref 8.9–12.7)
POTASSIUM SERPL-SCNC: 4.2 MMOL/L (ref 3.5–5.3)
PROT SERPL-MCNC: 8.2 G/DL (ref 6.4–8.2)
PROT UR STRIP-MCNC: NEGATIVE MG/DL
PSA SERPL-MCNC: 0.8 NG/ML (ref 0–4)
RBC # BLD AUTO: 4.77 MILLION/UL (ref 3.88–5.62)
RBC #/AREA URNS AUTO: ABNORMAL /HPF
SODIUM SERPL-SCNC: 139 MMOL/L (ref 136–145)
SP GR UR STRIP.AUTO: 1.02 (ref 1–1.03)
TRIGL SERPL-MCNC: 110 MG/DL
TSH SERPL DL<=0.05 MIU/L-ACNC: 2.48 UIU/ML (ref 0.36–3.74)
URATE SERPL-MCNC: 8.3 MG/DL (ref 4.2–8)
UROBILINOGEN UR QL STRIP.AUTO: 0.2 E.U./DL
WBC # BLD AUTO: 5.8 THOUSAND/UL (ref 4.31–10.16)
WBC #/AREA URNS AUTO: ABNORMAL /HPF

## 2020-02-11 PROCEDURE — 85652 RBC SED RATE AUTOMATED: CPT

## 2020-02-11 PROCEDURE — 85025 COMPLETE CBC W/AUTO DIFF WBC: CPT

## 2020-02-11 PROCEDURE — 36415 COLL VENOUS BLD VENIPUNCTURE: CPT

## 2020-02-11 PROCEDURE — 80061 LIPID PANEL: CPT

## 2020-02-11 PROCEDURE — 86430 RHEUMATOID FACTOR TEST QUAL: CPT

## 2020-02-11 PROCEDURE — 84443 ASSAY THYROID STIM HORMONE: CPT

## 2020-02-11 PROCEDURE — 80053 COMPREHEN METABOLIC PANEL: CPT

## 2020-02-11 PROCEDURE — G0103 PSA SCREENING: HCPCS

## 2020-02-11 PROCEDURE — 81001 URINALYSIS AUTO W/SCOPE: CPT

## 2020-02-11 PROCEDURE — 84550 ASSAY OF BLOOD/URIC ACID: CPT

## 2020-02-12 LAB — RHEUMATOID FACT SER QL LA: NEGATIVE

## 2020-02-18 ENCOUNTER — OFFICE VISIT (OUTPATIENT)
Dept: FAMILY MEDICINE CLINIC | Facility: CLINIC | Age: 60
End: 2020-02-18
Payer: COMMERCIAL

## 2020-02-18 VITALS
BODY MASS INDEX: 32.02 KG/M2 | HEART RATE: 72 BPM | HEIGHT: 62 IN | WEIGHT: 174 LBS | DIASTOLIC BLOOD PRESSURE: 78 MMHG | SYSTOLIC BLOOD PRESSURE: 120 MMHG

## 2020-02-18 DIAGNOSIS — E78.2 MIXED HYPERLIPIDEMIA: ICD-10-CM

## 2020-02-18 DIAGNOSIS — I10 ESSENTIAL HYPERTENSION: Primary | ICD-10-CM

## 2020-02-18 DIAGNOSIS — M25.50 MULTIPLE JOINT PAIN: ICD-10-CM

## 2020-02-18 DIAGNOSIS — R31.9 HEMATURIA, UNSPECIFIED TYPE: ICD-10-CM

## 2020-02-18 DIAGNOSIS — R19.5 HEME POSITIVE STOOL: ICD-10-CM

## 2020-02-18 PROCEDURE — 3078F DIAST BP <80 MM HG: CPT | Performed by: FAMILY MEDICINE

## 2020-02-18 PROCEDURE — 99213 OFFICE O/P EST LOW 20 MIN: CPT | Performed by: FAMILY MEDICINE

## 2020-02-18 PROCEDURE — 3008F BODY MASS INDEX DOCD: CPT | Performed by: FAMILY MEDICINE

## 2020-02-18 PROCEDURE — 1036F TOBACCO NON-USER: CPT | Performed by: FAMILY MEDICINE

## 2020-02-18 PROCEDURE — 3074F SYST BP LT 130 MM HG: CPT | Performed by: FAMILY MEDICINE

## 2020-02-18 RX ORDER — ATORVASTATIN CALCIUM 20 MG/1
20 TABLET, FILM COATED ORAL DAILY
Qty: 30 TABLET | Refills: 3 | Status: SHIPPED | OUTPATIENT
Start: 2020-02-18 | End: 2020-11-14 | Stop reason: SDUPTHER

## 2020-02-18 NOTE — PROGRESS NOTES
Assessment and Plan:  Call patient with results of urinalysis which will be done today  He had blood in his urine  Follow-up 3 months with labs  He also has an appointment with Gastroenterology for positive hemoccults and referral to rheumatology for his multiple joint pains as well as a positive KAROLINA  Problem List Items Addressed This Visit        Cardiovascular and Mediastinum    Essential hypertension - Primary     His blood pressure was high yesterday and he stayed home from work  He got an appointment for this morning to review his labs as well as his blood pressure and today is blood pressure was 120/78  He is on lisinopril-hydrochlorothiazide 20-12 5 mg 1 tablet daily  Relevant Orders    Comprehensive metabolic panel    Hemoglobin A1C    Lipid Panel with Direct LDL reflex    UA w Reflex to Microscopic w Reflex to Culture       Other    Hematuria     His urine showed blood and will get a repeat urine test today  If it is positive for blood he will see Urology  Relevant Orders    UA w Reflex to Microscopic w Reflex to Culture    UA w Reflex to Microscopic w Reflex to Culture    Heme positive stool     He has a gastroenterology appointment for April 3rd  Multiple joint pain     He has an appointment with rheumatology April 28th for a positive KAROLINA and his multiple joint pains  Relevant Orders    Comprehensive metabolic panel    Hemoglobin A1C    Lipid Panel with Direct LDL reflex    UA w Reflex to Microscopic w Reflex to Culture    Mixed hyperlipidemia     His total cholesterol went up from 191 to 207 and his LDL went up from 115 to 142 because he ran out of his cholesterol medicine several months ago and did not bother to call us to get it refilled           Relevant Medications    atorvastatin (LIPITOR) 20 mg tablet    Other Relevant Orders    Comprehensive metabolic panel    Hemoglobin A1C    Lipid Panel with Direct LDL reflex    UA w Reflex to Microscopic w Reflex to Culture                 Diagnoses and all orders for this visit:    Essential hypertension  -     Comprehensive metabolic panel; Future  -     Hemoglobin A1C; Future  -     Lipid Panel with Direct LDL reflex; Future  -     UA w Reflex to Microscopic w Reflex to Culture; Future    Mixed hyperlipidemia  -     atorvastatin (LIPITOR) 20 mg tablet; Take 1 tablet (20 mg total) by mouth daily  -     Comprehensive metabolic panel; Future  -     Hemoglobin A1C; Future  -     Lipid Panel with Direct LDL reflex; Future  -     UA w Reflex to Microscopic w Reflex to Culture; Future    Hematuria, unspecified type  -     UA w Reflex to Microscopic w Reflex to Culture; Future  -     UA w Reflex to Microscopic w Reflex to Culture; Future    Multiple joint pain  -     Comprehensive metabolic panel; Future  -     Hemoglobin A1C; Future  -     Lipid Panel with Direct LDL reflex; Future  -     UA w Reflex to Microscopic w Reflex to Culture; Future    Heme positive stool              Subjective:      Patient ID: Ezequiel Muniz is a 64 y o  male  CC:    Chief Complaint   Patient presents with    Hypertension     Review BW results  Pt states yesterday his BP was very high and he will need a note for work  He states pharmacy did not have his cholesterol medication  -  [facility]    Anxiety    Hyperlipidemia       HPI:    This is a 60-year-old male who comes in for his review of his labs  He missed work yesterday and stayed home because he took his blood pressure and his blood pressure was high  He did not come in yesterday but did come in today  His blood pressure today is 120/78  He is feeling better because he stayed in bed all day yesterday  He has an up and coming appointment with rheumatology on April 28th because of his multiple joint pains and his positive KAROLINA  His rheumatoid factor was negative and his sed rate was 11     He also has an appointment coming up on April 3rd with Gastroenterology because of his positive hemoccults  His weight today is 174 lb down 2 lb from the previous visit and his BMI is 31 8  Reviewing his labs, his CBC was normal, his urine showed blood and he will get a repeat urine done today  His uric acid was 8 3  His glucose went up from 92 to 111  A hemoglobin A1c will be done at his visit next time in 3 months  His TSH and PSA are both within normal limits  His cholesterol numbers went up slightly  His total cholesterol went up from 191 to 207, triglycerides are within normal range at 110, HDL is 43 and LDL went up from 115 to 142 because he ran out of his cholesterol medication and did not get it renewed  The following portions of the patient's history were reviewed and updated as appropriate: allergies, current medications, past family history, past medical history, past social history, past surgical history and problem list       Review of Systems   Constitutional: Negative  HENT: Negative  Eyes: Negative  Respiratory: Negative  Cardiovascular: Negative  Gastrointestinal: Negative  Endocrine: Negative  Genitourinary: Negative  Musculoskeletal: Negative  Skin: Negative  Allergic/Immunologic: Negative  Neurological: Negative  Hematological: Negative  Psychiatric/Behavioral: Negative  Data to review:       Objective:    Vitals:    02/18/20 0849   BP: 120/78   BP Location: Left arm   Patient Position: Sitting   Cuff Size: Large   Pulse: 72   Weight: 78 9 kg (174 lb)   Height: 5' 2" (1 575 m)        Physical Exam   Constitutional: He is oriented to person, place, and time  He appears well-developed and well-nourished  HENT:   Head: Normocephalic  Right Ear: External ear normal    Left Ear: External ear normal    Mouth/Throat: Oropharynx is clear and moist    Eyes: Pupils are equal, round, and reactive to light  Conjunctivae and EOM are normal    Neck: Normal range of motion  Neck supple     Cardiovascular: Normal rate, regular rhythm and normal heart sounds  Pulmonary/Chest: Effort normal and breath sounds normal    Abdominal: Soft  Bowel sounds are normal    Musculoskeletal: Normal range of motion  Neurological: He is alert and oriented to person, place, and time  Skin: Skin is warm  Psychiatric: He has a normal mood and affect  His behavior is normal  Judgment and thought content normal    Nursing note and vitals reviewed  BMI Counseling: Body mass index is 31 83 kg/m²  The BMI is above normal  Nutrition recommendations include decreasing portion sizes, encouraging healthy choices of fruits and vegetables, decreasing fast food intake, consuming healthier snacks, limiting drinks that contain sugar, moderation in carbohydrate intake, increasing intake of lean protein, reducing intake of saturated and trans fat and reducing intake of cholesterol  Exercise recommendations include exercising 3-5 times per week  No pharmacotherapy was ordered

## 2020-02-18 NOTE — ASSESSMENT & PLAN NOTE
He has an appointment with rheumatology April 28th for a positive KAROLINA and his multiple joint pains

## 2020-02-18 NOTE — ASSESSMENT & PLAN NOTE
His total cholesterol went up from 191 to 207 and his LDL went up from 115 to 142 because he ran out of his cholesterol medicine several months ago and did not bother to call us to get it refilled

## 2020-02-18 NOTE — ASSESSMENT & PLAN NOTE
His blood pressure was high yesterday and he stayed home from work  He got an appointment for this morning to review his labs as well as his blood pressure and today is blood pressure was 120/78  He is on lisinopril-hydrochlorothiazide 20-12 5 mg 1 tablet daily

## 2020-02-18 NOTE — ASSESSMENT & PLAN NOTE
His urine showed blood and will get a repeat urine test today  If it is positive for blood he will see Urology

## 2020-04-03 ENCOUNTER — TELEPHONE (OUTPATIENT)
Dept: GASTROENTEROLOGY | Facility: MEDICAL CENTER | Age: 60
End: 2020-04-03

## 2020-04-04 DIAGNOSIS — G47.00 INSOMNIA, UNSPECIFIED TYPE: ICD-10-CM

## 2020-04-04 DIAGNOSIS — I10 ESSENTIAL HYPERTENSION: ICD-10-CM

## 2020-04-04 RX ORDER — HYDROXYZINE HYDROCHLORIDE 10 MG/1
TABLET, FILM COATED ORAL
Qty: 30 TABLET | Refills: 1 | Status: SHIPPED | OUTPATIENT
Start: 2020-04-04 | End: 2020-11-14 | Stop reason: SDUPTHER

## 2020-04-04 RX ORDER — LISINOPRIL AND HYDROCHLOROTHIAZIDE 20; 12.5 MG/1; MG/1
TABLET ORAL
Qty: 30 TABLET | Refills: 1 | Status: SHIPPED | OUTPATIENT
Start: 2020-04-04 | End: 2020-11-14 | Stop reason: SDUPTHER

## 2020-06-13 DIAGNOSIS — I10 ESSENTIAL HYPERTENSION: ICD-10-CM

## 2020-06-13 DIAGNOSIS — G47.00 INSOMNIA, UNSPECIFIED TYPE: ICD-10-CM

## 2020-06-13 RX ORDER — LISINOPRIL AND HYDROCHLOROTHIAZIDE 20; 12.5 MG/1; MG/1
TABLET ORAL
Qty: 30 TABLET | Refills: 1 | OUTPATIENT
Start: 2020-06-13

## 2020-06-13 RX ORDER — HYDROXYZINE HYDROCHLORIDE 10 MG/1
TABLET, FILM COATED ORAL
Qty: 30 TABLET | Refills: 1 | OUTPATIENT
Start: 2020-06-13

## 2020-06-25 DIAGNOSIS — I10 ESSENTIAL HYPERTENSION: ICD-10-CM

## 2020-06-25 DIAGNOSIS — G47.00 INSOMNIA, UNSPECIFIED TYPE: ICD-10-CM

## 2020-06-26 RX ORDER — HYDROXYZINE HYDROCHLORIDE 10 MG/1
10 TABLET, FILM COATED ORAL
Qty: 30 TABLET | Refills: 2 | OUTPATIENT
Start: 2020-06-26

## 2020-06-26 RX ORDER — LISINOPRIL AND HYDROCHLOROTHIAZIDE 20; 12.5 MG/1; MG/1
1 TABLET ORAL DAILY
Qty: 30 TABLET | Refills: 2 | OUTPATIENT
Start: 2020-06-26

## 2020-11-14 ENCOUNTER — DOCUMENTATION (OUTPATIENT)
Dept: FAMILY MEDICINE CLINIC | Facility: CLINIC | Age: 60
End: 2020-11-14

## 2020-11-14 ENCOUNTER — TELEMEDICINE (OUTPATIENT)
Dept: FAMILY MEDICINE CLINIC | Facility: CLINIC | Age: 60
End: 2020-11-14
Payer: COMMERCIAL

## 2020-11-14 DIAGNOSIS — J02.9 SORE THROAT: Primary | ICD-10-CM

## 2020-11-14 DIAGNOSIS — R50.9 FEVER, UNSPECIFIED FEVER CAUSE: ICD-10-CM

## 2020-11-14 DIAGNOSIS — I10 ESSENTIAL HYPERTENSION: ICD-10-CM

## 2020-11-14 DIAGNOSIS — E78.2 MIXED HYPERLIPIDEMIA: ICD-10-CM

## 2020-11-14 DIAGNOSIS — J02.9 SORE THROAT: ICD-10-CM

## 2020-11-14 DIAGNOSIS — J98.8 CONGESTION OF UPPER AIRWAY: ICD-10-CM

## 2020-11-14 DIAGNOSIS — R51.9 NONINTRACTABLE HEADACHE, UNSPECIFIED CHRONICITY PATTERN, UNSPECIFIED HEADACHE TYPE: ICD-10-CM

## 2020-11-14 DIAGNOSIS — G47.00 INSOMNIA, UNSPECIFIED TYPE: ICD-10-CM

## 2020-11-14 DIAGNOSIS — J01.10 ACUTE NON-RECURRENT FRONTAL SINUSITIS: ICD-10-CM

## 2020-11-14 PROCEDURE — 99213 OFFICE O/P EST LOW 20 MIN: CPT | Performed by: FAMILY MEDICINE

## 2020-11-14 PROCEDURE — U0003 INFECTIOUS AGENT DETECTION BY NUCLEIC ACID (DNA OR RNA); SEVERE ACUTE RESPIRATORY SYNDROME CORONAVIRUS 2 (SARS-COV-2) (CORONAVIRUS DISEASE [COVID-19]), AMPLIFIED PROBE TECHNIQUE, MAKING USE OF HIGH THROUGHPUT TECHNOLOGIES AS DESCRIBED BY CMS-2020-01-R: HCPCS | Performed by: FAMILY MEDICINE

## 2020-11-14 RX ORDER — HYDROXYZINE HYDROCHLORIDE 10 MG/1
10 TABLET, FILM COATED ORAL
Qty: 30 TABLET | Refills: 1 | Status: SHIPPED | OUTPATIENT
Start: 2020-11-14 | End: 2022-02-05

## 2020-11-14 RX ORDER — LISINOPRIL AND HYDROCHLOROTHIAZIDE 20; 12.5 MG/1; MG/1
1 TABLET ORAL DAILY
Qty: 30 TABLET | Refills: 1 | Status: SHIPPED | OUTPATIENT
Start: 2020-11-14 | End: 2021-03-26 | Stop reason: ALTCHOICE

## 2020-11-14 RX ORDER — ATORVASTATIN CALCIUM 20 MG/1
20 TABLET, FILM COATED ORAL DAILY
Qty: 30 TABLET | Refills: 3 | Status: SHIPPED | OUTPATIENT
Start: 2020-11-14 | End: 2022-02-05 | Stop reason: SDUPTHER

## 2020-11-14 RX ORDER — AZITHROMYCIN 1 G
1 PACKET (EA) ORAL ONCE
Qty: 1 EACH | Refills: 0 | Status: SHIPPED | OUTPATIENT
Start: 2020-11-14 | End: 2020-11-14

## 2020-11-15 LAB — SARS-COV-2 RNA SPEC QL NAA+PROBE: DETECTED

## 2020-11-16 ENCOUNTER — TELEMEDICINE (OUTPATIENT)
Dept: FAMILY MEDICINE CLINIC | Facility: CLINIC | Age: 60
End: 2020-11-16
Payer: COMMERCIAL

## 2020-11-16 ENCOUNTER — TELEPHONE (OUTPATIENT)
Dept: FAMILY MEDICINE CLINIC | Facility: CLINIC | Age: 60
End: 2020-11-16

## 2020-11-16 DIAGNOSIS — U07.1 COVID-19: Primary | ICD-10-CM

## 2020-11-16 PROCEDURE — 99213 OFFICE O/P EST LOW 20 MIN: CPT | Performed by: NURSE PRACTITIONER

## 2020-11-17 ENCOUNTER — TELEMEDICINE (OUTPATIENT)
Dept: FAMILY MEDICINE CLINIC | Facility: CLINIC | Age: 60
End: 2020-11-17
Payer: COMMERCIAL

## 2020-11-17 DIAGNOSIS — U07.1 COVID-19: Primary | ICD-10-CM

## 2020-11-17 PROCEDURE — 99213 OFFICE O/P EST LOW 20 MIN: CPT | Performed by: NURSE PRACTITIONER

## 2020-11-18 ENCOUNTER — TELEMEDICINE (OUTPATIENT)
Dept: FAMILY MEDICINE CLINIC | Facility: CLINIC | Age: 60
End: 2020-11-18
Payer: COMMERCIAL

## 2020-11-18 DIAGNOSIS — U07.1 COVID-19: Primary | ICD-10-CM

## 2020-11-18 PROCEDURE — 99213 OFFICE O/P EST LOW 20 MIN: CPT | Performed by: NURSE PRACTITIONER

## 2020-11-19 ENCOUNTER — TELEMEDICINE (OUTPATIENT)
Dept: FAMILY MEDICINE CLINIC | Facility: CLINIC | Age: 60
End: 2020-11-19
Payer: COMMERCIAL

## 2020-11-19 DIAGNOSIS — U07.1 COVID-19: Primary | ICD-10-CM

## 2020-11-19 PROCEDURE — 99213 OFFICE O/P EST LOW 20 MIN: CPT | Performed by: NURSE PRACTITIONER

## 2020-11-20 ENCOUNTER — TELEMEDICINE (OUTPATIENT)
Dept: FAMILY MEDICINE CLINIC | Facility: CLINIC | Age: 60
End: 2020-11-20
Payer: COMMERCIAL

## 2020-11-20 DIAGNOSIS — U07.1 COVID-19: Primary | ICD-10-CM

## 2020-11-20 PROCEDURE — 99213 OFFICE O/P EST LOW 20 MIN: CPT | Performed by: NURSE PRACTITIONER

## 2020-11-23 ENCOUNTER — HOSPITAL ENCOUNTER (EMERGENCY)
Facility: HOSPITAL | Age: 60
Discharge: HOME | End: 2020-11-23
Attending: EMERGENCY MEDICINE | Admitting: EMERGENCY MEDICINE
Payer: COMMERCIAL

## 2020-11-23 ENCOUNTER — TELEMEDICINE (OUTPATIENT)
Dept: FAMILY MEDICINE CLINIC | Facility: CLINIC | Age: 60
End: 2020-11-23
Payer: COMMERCIAL

## 2020-11-23 VITALS
TEMPERATURE: 98.5 F | OXYGEN SATURATION: 97 % | WEIGHT: 167.99 LBS | DIASTOLIC BLOOD PRESSURE: 89 MMHG | BODY MASS INDEX: 30.73 KG/M2 | SYSTOLIC BLOOD PRESSURE: 154 MMHG | RESPIRATION RATE: 19 BRPM | HEART RATE: 86 BPM

## 2020-11-23 DIAGNOSIS — U07.1 COVID-19: Primary | ICD-10-CM

## 2020-11-23 PROCEDURE — 99283 EMERGENCY DEPT VISIT LOW MDM: CPT

## 2020-11-23 PROCEDURE — 99284 EMERGENCY DEPT VISIT MOD MDM: CPT | Performed by: EMERGENCY MEDICINE

## 2020-11-23 PROCEDURE — 1036F TOBACCO NON-USER: CPT | Performed by: NURSE PRACTITIONER

## 2020-11-23 PROCEDURE — 99214 OFFICE O/P EST MOD 30 MIN: CPT | Performed by: NURSE PRACTITIONER

## 2020-11-23 RX ORDER — AZITHROMYCIN 250 MG/1
TABLET, FILM COATED ORAL
Qty: 6 TABLET | Refills: 0 | Status: SHIPPED | OUTPATIENT
Start: 2020-11-23 | End: 2020-11-27

## 2020-11-23 RX ORDER — GUAIFENESIN AND CODEINE PHOSPHATE 100; 10 MG/5ML; MG/5ML
5 SOLUTION ORAL 3 TIMES DAILY PRN
Qty: 180 ML | Refills: 0 | Status: SHIPPED | OUTPATIENT
Start: 2020-11-23 | End: 2020-12-03

## 2020-11-23 RX ORDER — PREDNISONE 20 MG/1
60 TABLET ORAL DAILY
Qty: 15 TABLET | Refills: 0 | Status: SHIPPED | OUTPATIENT
Start: 2020-11-23 | End: 2021-03-26 | Stop reason: ALTCHOICE

## 2020-11-27 ENCOUNTER — TELEMEDICINE (OUTPATIENT)
Dept: FAMILY MEDICINE CLINIC | Facility: CLINIC | Age: 60
End: 2020-11-27
Payer: COMMERCIAL

## 2020-11-27 DIAGNOSIS — U07.1 COVID-19: Primary | ICD-10-CM

## 2020-11-27 DIAGNOSIS — G47.00 INSOMNIA, UNSPECIFIED TYPE: ICD-10-CM

## 2020-11-27 DIAGNOSIS — F41.9 ANXIETY: ICD-10-CM

## 2020-11-27 PROCEDURE — 99213 OFFICE O/P EST LOW 20 MIN: CPT | Performed by: NURSE PRACTITIONER

## 2020-11-27 RX ORDER — TRAZODONE HYDROCHLORIDE 50 MG/1
50 TABLET ORAL
Qty: 30 TABLET | Refills: 0 | Status: SHIPPED | OUTPATIENT
Start: 2020-11-27 | End: 2021-03-26 | Stop reason: ALTCHOICE

## 2020-11-30 ENCOUNTER — TELEMEDICINE (OUTPATIENT)
Dept: FAMILY MEDICINE CLINIC | Facility: CLINIC | Age: 60
End: 2020-11-30
Payer: COMMERCIAL

## 2020-11-30 DIAGNOSIS — F41.9 ANXIETY: ICD-10-CM

## 2020-11-30 DIAGNOSIS — U07.1 COVID-19: Primary | ICD-10-CM

## 2020-11-30 PROCEDURE — 99213 OFFICE O/P EST LOW 20 MIN: CPT | Performed by: NURSE PRACTITIONER

## 2020-12-02 ENCOUNTER — TELEPHONE (OUTPATIENT)
Dept: FAMILY MEDICINE CLINIC | Facility: CLINIC | Age: 60
End: 2020-12-02

## 2020-12-08 ENCOUNTER — TELEMEDICINE (OUTPATIENT)
Dept: FAMILY MEDICINE CLINIC | Facility: CLINIC | Age: 60
End: 2020-12-08
Payer: COMMERCIAL

## 2020-12-08 DIAGNOSIS — R42 DIZZY SPELLS: Primary | ICD-10-CM

## 2020-12-08 PROCEDURE — 1036F TOBACCO NON-USER: CPT | Performed by: NURSE PRACTITIONER

## 2020-12-08 PROCEDURE — 99213 OFFICE O/P EST LOW 20 MIN: CPT | Performed by: NURSE PRACTITIONER

## 2020-12-08 RX ORDER — MECLIZINE HYDROCHLORIDE 25 MG/1
25 TABLET ORAL 3 TIMES DAILY PRN
Qty: 30 TABLET | Refills: 0 | Status: SHIPPED | OUTPATIENT
Start: 2020-12-08 | End: 2022-02-05

## 2020-12-20 DIAGNOSIS — G47.00 INSOMNIA, UNSPECIFIED TYPE: ICD-10-CM

## 2020-12-20 RX ORDER — TRAZODONE HYDROCHLORIDE 50 MG/1
TABLET ORAL
Qty: 30 TABLET | Refills: 0 | OUTPATIENT
Start: 2020-12-20

## 2021-02-09 ENCOUNTER — TELEPHONE (OUTPATIENT)
Dept: FAMILY MEDICINE CLINIC | Facility: CLINIC | Age: 61
End: 2021-02-09

## 2021-03-25 NOTE — PROGRESS NOTES
Assessment and Plan:    Problem List Items Addressed This Visit        Cardiovascular and Mediastinum    Essential hypertension - Primary     Patient blood pressure is elevated  Will change patient medication  He is to start olmesartan hctz 20/12  5  will recheck bp in 3 weeks  Relevant Medications    amLODIPine (NORVASC) 5 mg tablet    olmesartan-hydrochlorothiazide (BENICAR HCT) 20-12 5 MG per tablet    Other Relevant Orders    Lipid panel    Comprehensive metabolic panel    Hemoglobin A1C    TSH, 3rd generation    POCT ECG    Echo stress test w contrast if indicated       Other    Insomnia     Will rx elavil 10mg at bedtime  Relevant Medications    clotrimazole-betamethasone (LOTRISONE) 1-0 05 % cream    Mild depression ([facility] [address] )     Patient has not been on medications for depression  Patient is willing to try medication  Will start amitriptyline 10mg         Relevant Medications    amitriptyline (ELAVIL) 10 mg tablet    Screening for prostate cancer    Relevant Orders    PSA, Total Screen    Mixed hyperlipidemia     Patient is overdue for labs  Currently on atorvastatin 20         Relevant Orders    Lipid panel    Comprehensive metabolic panel    Systolic murmur     Need echo         Relevant Orders    Echo stress test w contrast if indicated    Chest pain     likely chest pain related to uncontrolled hypertension  EKG in office no change but flat t waves  Will order stress echo         Relevant Orders    POCT ECG    Echo stress test w contrast if indicated                 Diagnoses and all orders for this visit:    Essential hypertension  -     Lipid panel  -     Comprehensive metabolic panel; Future  -     Hemoglobin A1C  -     TSH, 3rd generation  -     amLODIPine (NORVASC) 5 mg tablet; Take 1 tablet (5 mg total) by mouth daily  -     olmesartan-hydrochlorothiazide (BENICAR HCT) 20-12 5 MG per tablet;  Take 1 tablet by mouth daily  -     POCT ECG  -     Echo stress test w contrast if indicated; Future    Insomnia, unspecified type  -     clotrimazole-betamethasone (LOTRISONE) 1-0 05 % cream; Apply topically 2 (two) times a day    Mixed hyperlipidemia  -     Lipid panel  -     Comprehensive metabolic panel; Future    Systolic murmur  -     Echo stress test w contrast if indicated; Future    Screening for prostate cancer  -     PSA, Total Screen; Future    Chest pain, unspecified type  -     POCT ECG  -     Echo stress test w contrast if indicated; Future    Mild depression (HCC)  -     amitriptyline (ELAVIL) 10 mg tablet; Take 1 tablet (10 mg total) by mouth daily at bedtime    Other orders  -     Cancel: POCT ECG  -     Cancel: lisinopril-hydrochlorothiazide (PRINZIDE,ZESTORETIC) 20-12 5 MG per tablet; Take 1 tablet by mouth daily  -     Cancel: POCT ECG              Subjective:      Patient ID: Mark Carlos is a 64 y o  male  CC:    Chief Complaint   Patient presents with    Chest Pain     when asked ptt about chest pain he was c/o yesterday when appointment when scheduled pt stated he has chest pain "sometimes"   Medication Refill     pt states he needs medication refills on pending medication, for 90 days   States he does not like going to pharmacy every month        HPI:    Patient reports he has a few days of chest pain tat felt like pinching over the left side of his chest  He state he rubbed the area and it went away  Noticed when he working  He works overnight  He reports the pain lasts a few minutes  He has no associated symptoms  He does not drink or smoke  Patient reports that he has a co worker who had a heart attack at his job  He also having pain in both shoulder  Patient was given medication for sleep and states he felt worse on it later  Patient did have covid and feels that this has effected him         The following portions of the patient's history were reviewed and updated as appropriate: allergies, current medications, past family history, past medical history, past social history, past surgical history and problem list       Review of Systems   Constitutional: Negative for diaphoresis, fatigue and unexpected weight change  HENT: Negative for trouble swallowing  Eyes: Negative for visual disturbance  Respiratory: Negative for cough, chest tightness and shortness of breath  Cardiovascular: Positive for chest pain  Negative for palpitations and leg swelling  Gastrointestinal: Negative for constipation  Endocrine: Negative for polydipsia, polyphagia and polyuria  Genitourinary: Negative for difficulty urinating  Musculoskeletal: Negative for arthralgias and myalgias  Neurological: Negative for dizziness, light-headedness and headaches  Psychiatric/Behavioral: Positive for dysphoric mood and sleep disturbance  Data to review:       Objective:    Vitals:    03/26/21 0931   BP: 158/82   BP Location: Left arm   Patient Position: Sitting   Cuff Size: Adult   Pulse: 74   Temp: 98 °F (36 7 °C)   TempSrc: Temporal   Weight: 79 4 kg (175 lb)        Physical Exam  Vitals signs and nursing note reviewed  Constitutional:       Appearance: He is not ill-appearing  HENT:      Head: Normocephalic and atraumatic  Right Ear: Tympanic membrane normal       Left Ear: Tympanic membrane normal    Eyes:      Extraocular Movements: Extraocular movements intact  Conjunctiva/sclera: Conjunctivae normal    Neck:      Thyroid: No thyromegaly  Vascular: No carotid bruit or JVD  Cardiovascular:      Rate and Rhythm: Normal rate and regular rhythm  Pulses:           Carotid pulses are 2+ on the right side and 2+ on the left side  Heart sounds: Normal heart sounds, S1 normal and S2 normal  No murmur  Pulmonary:      Effort: Pulmonary effort is normal  No respiratory distress  Breath sounds: Normal breath sounds and air entry  No wheezing  Abdominal:      General: Bowel sounds are normal  There is no distension        Palpations: Abdomen is soft    Musculoskeletal:      Right lower leg: No edema  Left lower leg: No edema  Skin:     Coloration: Skin is not pale  Findings: No erythema  Neurological:      Mental Status: He is alert and oriented to person, place, and time  Psychiatric:         Mood and Affect: Mood normal          Behavior: Behavior normal          Thought Content:  Thought content normal          Judgment: Judgment normal

## 2021-03-26 ENCOUNTER — OFFICE VISIT (OUTPATIENT)
Dept: FAMILY MEDICINE CLINIC | Facility: CLINIC | Age: 61
End: 2021-03-26
Payer: COMMERCIAL

## 2021-03-26 VITALS
BODY MASS INDEX: 32.01 KG/M2 | DIASTOLIC BLOOD PRESSURE: 82 MMHG | TEMPERATURE: 98 F | SYSTOLIC BLOOD PRESSURE: 158 MMHG | HEART RATE: 74 BPM | WEIGHT: 175 LBS

## 2021-03-26 DIAGNOSIS — G47.00 INSOMNIA, UNSPECIFIED TYPE: ICD-10-CM

## 2021-03-26 DIAGNOSIS — R07.9 CHEST PAIN, UNSPECIFIED TYPE: ICD-10-CM

## 2021-03-26 DIAGNOSIS — Z12.5 SCREENING FOR PROSTATE CANCER: ICD-10-CM

## 2021-03-26 DIAGNOSIS — I10 ESSENTIAL HYPERTENSION: Primary | ICD-10-CM

## 2021-03-26 DIAGNOSIS — R01.1 SYSTOLIC MURMUR: ICD-10-CM

## 2021-03-26 DIAGNOSIS — F32.A MILD DEPRESSION: ICD-10-CM

## 2021-03-26 DIAGNOSIS — E78.2 MIXED HYPERLIPIDEMIA: ICD-10-CM

## 2021-03-26 PROCEDURE — 99214 OFFICE O/P EST MOD 30 MIN: CPT | Performed by: NURSE PRACTITIONER

## 2021-03-26 PROCEDURE — 1036F TOBACCO NON-USER: CPT | Performed by: NURSE PRACTITIONER

## 2021-03-26 PROCEDURE — 3077F SYST BP >= 140 MM HG: CPT | Performed by: NURSE PRACTITIONER

## 2021-03-26 PROCEDURE — 3079F DIAST BP 80-89 MM HG: CPT | Performed by: NURSE PRACTITIONER

## 2021-03-26 PROCEDURE — 3725F SCREEN DEPRESSION PERFORMED: CPT | Performed by: NURSE PRACTITIONER

## 2021-03-26 RX ORDER — CLOTRIMAZOLE AND BETAMETHASONE DIPROPIONATE 10; .64 MG/G; MG/G
CREAM TOPICAL 2 TIMES DAILY
Qty: 45 G | Refills: 3 | Status: SHIPPED | OUTPATIENT
Start: 2021-03-26 | End: 2022-02-05 | Stop reason: SDUPTHER

## 2021-03-26 RX ORDER — AMLODIPINE BESYLATE 5 MG/1
5 TABLET ORAL DAILY
Qty: 90 TABLET | Refills: 1 | Status: SHIPPED | OUTPATIENT
Start: 2021-03-26 | End: 2021-09-20

## 2021-03-26 RX ORDER — OLMESARTAN MEDOXOMIL AND HYDROCHLOROTHIAZIDE 20/12.5 20; 12.5 MG/1; MG/1
1 TABLET ORAL DAILY
Qty: 90 TABLET | Refills: 0 | Status: SHIPPED | OUTPATIENT
Start: 2021-03-26 | End: 2022-02-05 | Stop reason: SDUPTHER

## 2021-03-26 RX ORDER — LISINOPRIL AND HYDROCHLOROTHIAZIDE 20; 12.5 MG/1; MG/1
1 TABLET ORAL DAILY
Qty: 30 TABLET | Refills: 1 | Status: CANCELLED | OUTPATIENT
Start: 2021-03-26

## 2021-03-26 RX ORDER — AMITRIPTYLINE HYDROCHLORIDE 10 MG/1
10 TABLET, FILM COATED ORAL
Qty: 90 TABLET | Refills: 0 | Status: SHIPPED | OUTPATIENT
Start: 2021-03-26 | End: 2021-06-17

## 2021-03-26 NOTE — ASSESSMENT & PLAN NOTE
Patient has not been on medications for depression  Patient is willing to try medication   Will start amitriptyline 10mg

## 2021-03-26 NOTE — ASSESSMENT & PLAN NOTE
Patient blood pressure is elevated  Will change patient medication  He is to start olmesartan hctz 20/12  5  will recheck bp in 3 weeks

## 2021-03-26 NOTE — ASSESSMENT & PLAN NOTE
likely chest pain related to uncontrolled hypertension  EKG in office no change but flat t waves   Will order stress echo

## 2021-03-31 PROCEDURE — 93000 ELECTROCARDIOGRAM COMPLETE: CPT | Performed by: NURSE PRACTITIONER

## 2021-06-17 DIAGNOSIS — F32.A MILD DEPRESSION: ICD-10-CM

## 2021-06-17 RX ORDER — AMITRIPTYLINE HYDROCHLORIDE 10 MG/1
TABLET, FILM COATED ORAL
Qty: 90 TABLET | Refills: 0 | Status: SHIPPED | OUTPATIENT
Start: 2021-06-17 | End: 2022-02-05

## 2021-09-19 DIAGNOSIS — I10 ESSENTIAL HYPERTENSION: ICD-10-CM

## 2021-09-24 RX ORDER — AMLODIPINE BESYLATE 5 MG/1
TABLET ORAL
Qty: 30 TABLET | Refills: 0 | Status: SHIPPED | OUTPATIENT
Start: 2021-09-24 | End: 2022-02-05 | Stop reason: SDUPTHER

## 2021-10-03 ENCOUNTER — HOSPITAL ENCOUNTER (EMERGENCY)
Facility: HOSPITAL | Age: 61
Discharge: HOME | End: 2021-10-03
Attending: EMERGENCY MEDICINE
Payer: COMMERCIAL

## 2021-10-03 VITALS
WEIGHT: 175 LBS | BODY MASS INDEX: 32.01 KG/M2 | OXYGEN SATURATION: 96 % | RESPIRATION RATE: 18 BRPM | SYSTOLIC BLOOD PRESSURE: 165 MMHG | TEMPERATURE: 98.1 F | HEART RATE: 90 BPM | DIASTOLIC BLOOD PRESSURE: 99 MMHG

## 2021-10-03 DIAGNOSIS — Z20.822 ENCOUNTER FOR LABORATORY TESTING FOR COVID-19 VIRUS: ICD-10-CM

## 2021-10-03 DIAGNOSIS — J06.9 URI (UPPER RESPIRATORY INFECTION): Primary | ICD-10-CM

## 2021-10-03 LAB
FLUAV RNA RESP QL NAA+PROBE: NEGATIVE
FLUBV RNA RESP QL NAA+PROBE: NEGATIVE
RSV RNA RESP QL NAA+PROBE: NEGATIVE
S PYO DNA THROAT QL NAA+PROBE: NORMAL
SARS-COV-2 RNA RESP QL NAA+PROBE: NEGATIVE

## 2021-10-03 PROCEDURE — 0241U HB NFCT DS VIR RESP RNA 4 TRGT: CPT | Performed by: PHYSICIAN ASSISTANT

## 2021-10-03 PROCEDURE — 99283 EMERGENCY DEPT VISIT LOW MDM: CPT

## 2021-10-03 PROCEDURE — 87651 STREP A DNA AMP PROBE: CPT | Performed by: PHYSICIAN ASSISTANT

## 2021-10-03 PROCEDURE — 99282 EMERGENCY DEPT VISIT SF MDM: CPT | Performed by: PHYSICIAN ASSISTANT

## 2021-10-04 ENCOUNTER — TELEPHONE (OUTPATIENT)
Dept: EMERGENCY DEPT | Facility: HOSPITAL | Age: 61
End: 2021-10-04

## 2022-02-05 ENCOUNTER — OFFICE VISIT (OUTPATIENT)
Dept: FAMILY MEDICINE CLINIC | Facility: CLINIC | Age: 62
End: 2022-02-05
Payer: COMMERCIAL

## 2022-02-05 VITALS
SYSTOLIC BLOOD PRESSURE: 132 MMHG | HEART RATE: 80 BPM | DIASTOLIC BLOOD PRESSURE: 88 MMHG | HEIGHT: 65 IN | WEIGHT: 179 LBS | OXYGEN SATURATION: 96 % | BODY MASS INDEX: 29.82 KG/M2

## 2022-02-05 DIAGNOSIS — G47.00 INSOMNIA, UNSPECIFIED TYPE: ICD-10-CM

## 2022-02-05 DIAGNOSIS — Z12.11 SCREENING FOR COLON CANCER: ICD-10-CM

## 2022-02-05 DIAGNOSIS — F41.9 ANXIETY: ICD-10-CM

## 2022-02-05 DIAGNOSIS — E78.2 MIXED HYPERLIPIDEMIA: Primary | ICD-10-CM

## 2022-02-05 DIAGNOSIS — L30.9 DERMATITIS: ICD-10-CM

## 2022-02-05 DIAGNOSIS — I10 ESSENTIAL HYPERTENSION: ICD-10-CM

## 2022-02-05 PROBLEM — R07.9 CHEST PAIN: Status: RESOLVED | Noted: 2021-03-26 | Resolved: 2022-02-05

## 2022-02-05 PROBLEM — M25.50 MULTIPLE JOINT PAIN: Status: RESOLVED | Noted: 2018-04-19 | Resolved: 2022-02-05

## 2022-02-05 PROBLEM — B35.4 TINEA CORPORIS: Status: RESOLVED | Noted: 2020-02-06 | Resolved: 2022-02-05

## 2022-02-05 PROBLEM — U07.1 COVID-19: Status: RESOLVED | Noted: 2020-11-16 | Resolved: 2022-02-05

## 2022-02-05 PROBLEM — Z12.5 SCREENING FOR PROSTATE CANCER: Status: RESOLVED | Noted: 2018-04-19 | Resolved: 2022-02-05

## 2022-02-05 PROCEDURE — 1036F TOBACCO NON-USER: CPT | Performed by: FAMILY MEDICINE

## 2022-02-05 PROCEDURE — 99214 OFFICE O/P EST MOD 30 MIN: CPT | Performed by: FAMILY MEDICINE

## 2022-02-05 PROCEDURE — 3075F SYST BP GE 130 - 139MM HG: CPT | Performed by: FAMILY MEDICINE

## 2022-02-05 PROCEDURE — 3008F BODY MASS INDEX DOCD: CPT | Performed by: FAMILY MEDICINE

## 2022-02-05 PROCEDURE — 3079F DIAST BP 80-89 MM HG: CPT | Performed by: FAMILY MEDICINE

## 2022-02-05 RX ORDER — CLOTRIMAZOLE AND BETAMETHASONE DIPROPIONATE 10; .64 MG/G; MG/G
CREAM TOPICAL 2 TIMES DAILY
Qty: 45 G | Refills: 3 | Status: SHIPPED | OUTPATIENT
Start: 2022-02-05

## 2022-02-05 RX ORDER — TRAZODONE HYDROCHLORIDE 50 MG/1
50 TABLET ORAL
Qty: 30 TABLET | Refills: 1 | Status: SHIPPED | OUTPATIENT
Start: 2022-02-05

## 2022-02-05 RX ORDER — ATORVASTATIN CALCIUM 20 MG/1
20 TABLET, FILM COATED ORAL DAILY
Qty: 30 TABLET | Refills: 3 | Status: SHIPPED | OUTPATIENT
Start: 2022-02-05

## 2022-02-05 RX ORDER — OLMESARTAN MEDOXOMIL AND HYDROCHLOROTHIAZIDE 20/12.5 20; 12.5 MG/1; MG/1
1 TABLET ORAL DAILY
Qty: 90 TABLET | Refills: 0 | Status: SHIPPED | OUTPATIENT
Start: 2022-02-05

## 2022-02-05 RX ORDER — AMLODIPINE BESYLATE 5 MG/1
5 TABLET ORAL DAILY
Qty: 30 TABLET | Refills: 0 | Status: SHIPPED | OUTPATIENT
Start: 2022-02-05 | End: 2022-05-28

## 2022-02-05 NOTE — PROGRESS NOTES
Assessment and Plan:    Mr Alessio Barba presents to day to f/u on chronic conditions  He requires refills on his meds  He complains of insomnia and the last medicine that was presribed made him "not feel good "  I believe that was hydroxyzine  1  Lipids - cont  lipitor  2  HTN - stable  Continue amlodipine  3  Anxiety - stable for now  Not taking elavil or hydroxyzine  4  Insomnia - trial of trazodone 50mg tab  5  Dermatitis - groin; intermittent; Lotrisone, prn   6  HCM - cologuard ordered  7  Shoulder pain - given stretches and muscle strengthening exercises  He states when he was in DR he had no pain  I explained he needs to strengthen the opposing muscle groups to avoid pain associated with weak anterior arm musculature  He will notify me if he wants to initiate PT  Refills ordered  Pt to f/u in 2 months  Labs were ordered 3/21  Pt was in \[facility]\"" and is back now so will get labs  Subjective:      Patient ID: Delon Ramirez is a 63 y o  male  CC:    Chief Complaint   Patient presents with    Follow-up     Patient present today for his routine follow up   Shoulder Pain     pt complaints of bilateral shoulder pain  HPI:    Mr Alessio Barba presents to day to f/u on chronic conditions  He requires refills on his meds  He complains of insomnia and the last medicine that was presribed made him "not feel good "  I believe that was hydroxyzine  He is also not taking elavil  The following portions of the patient's history were reviewed and updated as appropriate: allergies, current medications, past family history, past medical history, past social history, past surgical history and problem list       Review of Systems   Constitutional:        See HPI   HENT: Negative for congestion, ear pain, mouth sores, sinus pressure and trouble swallowing  Eyes: Negative for discharge, redness and itching     Respiratory: Negative for apnea, cough, chest tightness, shortness of breath, wheezing and stridor  Cardiovascular: Negative for chest pain, palpitations and leg swelling  Gastrointestinal: Negative for abdominal distention, abdominal pain, blood in stool, constipation, diarrhea, nausea and vomiting  Endocrine: Negative for cold intolerance and heat intolerance  Genitourinary: Negative for difficulty urinating, dysuria, flank pain and urgency  Musculoskeletal: Positive for myalgias  Negative for arthralgias  Bilateral shoulder pain  Chronic; 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  Skin: Negative for rash  Neurological: Negative for dizziness, seizures, syncope, speech difficulty, weakness, light-headedness, numbness and headaches  Hematological: Negative for adenopathy  Psychiatric/Behavioral: Negative for agitation, behavioral problems, confusion and sleep disturbance  The patient is not nervous/anxious  Data to review:       Objective:    Vitals:    02/05/22 0853   BP: 132/88   BP Location: Right arm   Patient Position: Sitting   Cuff Size: Large   Pulse: 80   SpO2: 96%   Weight: 81 2 kg (179 lb)   Height: 5' 5" (1 651 m)        Physical Exam  Vitals and nursing note reviewed  Constitutional:       General: He is not in acute distress  Appearance: Normal appearance  He is normal weight  He is not ill-appearing  Eyes:      Conjunctiva/sclera: Conjunctivae normal    Neck:      Vascular: No carotid bruit  Cardiovascular:      Rate and Rhythm: Normal rate and regular rhythm  Heart sounds: No murmur heard  No friction rub  No gallop  Pulmonary:      Effort: Pulmonary effort is normal  No respiratory distress  Breath sounds: No stridor  No wheezing, rhonchi or rales  Abdominal:      General: Abdomen is flat  There is no distension  Palpations: Abdomen is soft  There is no mass  Tenderness: There is no abdominal tenderness  There is no right CVA tenderness, left CVA tenderness, guarding or rebound  Hernia: No hernia is present  Musculoskeletal:      Cervical back: Normal range of motion  Right lower leg: No edema  Left lower leg: No edema  Comments: Tender to palpation over anterior aspect of R shoulder and long head of biceps, bilaterally  Motor and sensory intact  ROM intact  Skin:     General: Skin is warm  Coloration: Skin is not jaundiced  Neurological:      General: No focal deficit present  Mental Status: He is alert  Psychiatric:         Mood and Affect: Mood normal          Behavior: Behavior normal          Thought Content: Thought content normal          Judgment: Judgment normal          Body mass index is 29 79 kg/m²  BMI Counseling: Body mass index is 29 79 kg/m²  The BMI is above normal  Nutrition recommendations include decreasing portion sizes, encouraging healthy choices of fruits and vegetables and decreasing fast food intake  Exercise recommendations include exercising 3-5 times per week  Rationale for BMI follow-up plan is due to patient being overweight or obese

## 2022-05-27 DIAGNOSIS — I10 ESSENTIAL HYPERTENSION: ICD-10-CM

## 2022-05-31 RX ORDER — AMLODIPINE BESYLATE 5 MG/1
5 TABLET ORAL DAILY
Qty: 30 TABLET | Refills: 0 | Status: SHIPPED | OUTPATIENT
Start: 2022-05-31

## 2022-11-18 ENCOUNTER — OFFICE VISIT (OUTPATIENT)
Dept: FAMILY MEDICINE CLINIC | Facility: CLINIC | Age: 62
End: 2022-11-18

## 2022-11-18 VITALS
OXYGEN SATURATION: 98 % | HEIGHT: 65 IN | WEIGHT: 179 LBS | TEMPERATURE: 96.7 F | DIASTOLIC BLOOD PRESSURE: 90 MMHG | HEART RATE: 77 BPM | BODY MASS INDEX: 29.82 KG/M2 | SYSTOLIC BLOOD PRESSURE: 154 MMHG

## 2022-11-18 DIAGNOSIS — Z12.5 SCREENING FOR PROSTATE CANCER: ICD-10-CM

## 2022-11-18 DIAGNOSIS — M25.511 CHRONIC PAIN OF BOTH SHOULDERS: ICD-10-CM

## 2022-11-18 DIAGNOSIS — I10 ESSENTIAL HYPERTENSION: Primary | ICD-10-CM

## 2022-11-18 DIAGNOSIS — M25.512 CHRONIC PAIN OF BOTH SHOULDERS: ICD-10-CM

## 2022-11-18 DIAGNOSIS — F32.A MILD DEPRESSION: ICD-10-CM

## 2022-11-18 DIAGNOSIS — R76.8 POSITIVE ANA (ANTINUCLEAR ANTIBODY): ICD-10-CM

## 2022-11-18 DIAGNOSIS — G89.29 CHRONIC PAIN OF BOTH SHOULDERS: ICD-10-CM

## 2022-11-18 DIAGNOSIS — Z12.11 SCREEN FOR COLON CANCER: ICD-10-CM

## 2022-11-18 DIAGNOSIS — M79.10 TRIGGER POINT: ICD-10-CM

## 2022-11-18 DIAGNOSIS — E78.2 MIXED HYPERLIPIDEMIA: ICD-10-CM

## 2022-11-18 RX ORDER — OLMESARTAN MEDOXOMIL AND HYDROCHLOROTHIAZIDE 20/12.5 20; 12.5 MG/1; MG/1
1 TABLET ORAL DAILY
Qty: 90 TABLET | Refills: 0 | Status: SHIPPED | OUTPATIENT
Start: 2022-11-18

## 2022-11-18 RX ORDER — MELOXICAM 15 MG/1
15 TABLET ORAL DAILY
Qty: 90 TABLET | Refills: 0 | Status: SHIPPED | OUTPATIENT
Start: 2022-11-18

## 2022-11-18 RX ORDER — ATORVASTATIN CALCIUM 20 MG/1
20 TABLET, FILM COATED ORAL DAILY
Qty: 90 TABLET | Refills: 0 | Status: SHIPPED | OUTPATIENT
Start: 2022-11-18

## 2022-11-18 RX ORDER — CYCLOBENZAPRINE HCL 10 MG
10 TABLET ORAL
Qty: 90 TABLET | Refills: 0 | Status: SHIPPED | OUTPATIENT
Start: 2022-11-18

## 2022-11-18 RX ORDER — AMLODIPINE BESYLATE 5 MG/1
5 TABLET ORAL DAILY
Qty: 90 TABLET | Refills: 0 | Status: SHIPPED | OUTPATIENT
Start: 2022-11-18

## 2022-11-18 NOTE — ASSESSMENT & PLAN NOTE
Will check bilateral xray of shoulder  suspect this is more msk related with spasm  Will change advil to mobic and add flexeril  patient advised that may cause drowsiness so to take at home

## 2022-11-18 NOTE — PROGRESS NOTES
Name: Cherylle Osgood      : 1960      MRN: [id number]  Encounter Provider: KORINA Steiner  Encounter Date: 2022   Encounter department: [place] [address]  Essential hypertension  Assessment & Plan:  Patient blood pressure is elevated but he has been out of his medications  Will restart his medications  Recheck in 3 weeks     Orders:  -     Comprehensive metabolic panel; Future  -     olmesartan-hydrochlorothiazide (BENICAR HCT) 20-12 5 MG per tablet; Take 1 tablet by mouth daily  -     amLODIPine (NORVASC) 5 mg tablet; Take 1 tablet (5 mg total) by mouth daily    2  Mixed hyperlipidemia  Assessment & Plan:  Renew cholesterol medication  Recheck lipids    Orders:  -     Lipid panel  -     TSH, 3rd generation with Free T4 reflex; Future  -     atorvastatin (LIPITOR) 20 mg tablet; Take 1 tablet (20 mg total) by mouth daily    3  Positive BRYAN (antinuclear antibody)  Assessment & Plan:  Recheck bryan and inflammatory markers    Orders:  -     BRYAN Screen w/ Reflex to Titer/Pattern; Future  -     C-reactive protein; Future  -     Sedimentation rate, automated; Future    4  Mild depression  Assessment & Plan:  Resolved       5  Screening for prostate cancer  Assessment & Plan:  psa ordered     Orders:  -     PSA, Total Screen; Future    6  Chronic pain of both shoulders  Assessment & Plan: Will check bilateral xray of shoulder  suspect this is more msk related with spasm  Will change advil to mobic and add flexeril  patient advised that may cause drowsiness so to take at home  Orders:  -     XR shoulder 2+ vw left; Future; Expected date: 2022  -     XR shoulder 2+ vw right; Future; Expected date: 2022  -     cyclobenzaprine (FLEXERIL) 10 mg tablet; Take 1 tablet (10 mg total) by mouth daily at bedtime  -     meloxicam (Mobic) 15 mg tablet; Take 1 tablet (15 mg total) by mouth daily    7   Trigger point  Comments:  left shoulder worse than right shoulder   Orders:  -     cyclobenzaprine (FLEXERIL) 10 mg tablet; Take 1 tablet (10 mg total) by mouth daily at bedtime  -     meloxicam (Mobic) 15 mg tablet; Take 1 tablet (15 mg total) by mouth daily    8  Screen for colon cancer  -     Ambulatory Referral to General Surgery; Future           Subjective      Patient reports that he has been having bilateral shoulder pain but reports he las left shoulder pain that is worse  It is affecting his sleep  He states he has to leave work  While he working it doesn't hurt but when he is resting is when it hurts the most    He was taking 3 advil but this is not effective  He also been using icyhot which is not effective     He reports he was having issues with depression because of covid and financial struggles he is doing well now  He works nightshift     Review of Systems   Constitutional: Positive for activity change  Respiratory: Negative for chest tightness and shortness of breath  Cardiovascular: Negative for chest pain, palpitations and leg swelling  Genitourinary: Negative for difficulty urinating  Musculoskeletal: Positive for arthralgias and back pain  Neurological: Negative for dizziness, light-headedness and headaches  Psychiatric/Behavioral: Positive for sleep disturbance  Negative for dysphoric mood  Current Outpatient Medications on File Prior to Visit   Medication Sig   • [DISCONTINUED] amLODIPine (NORVASC) 5 mg tablet TAKE 1 TABLET (5 MG TOTAL) BY MOUTH DAILY     • [DISCONTINUED] atorvastatin (LIPITOR) 20 mg tablet Take 1 tablet (20 mg total) by mouth daily   • [DISCONTINUED] olmesartan-hydrochlorothiazide (BENICAR HCT) 20-12 5 MG per tablet Take 1 tablet by mouth daily   • [DISCONTINUED] clotrimazole-betamethasone (LOTRISONE) 1-0 05 % cream Apply topically 2 (two) times a day (Patient not taking: Reported on 11/18/2022)   • [DISCONTINUED] traZODone (DESYREL) 50 mg tablet Take 1 tablet (50 mg total) by mouth daily at bedtime (Patient not taking: Reported on 11/18/2022)       Objective     /90 (BP Location: Right arm, Patient Position: Sitting, Cuff Size: Large)   Pulse 77   Temp (!) 96 7 °F (35 9 °C) (Temporal)   Ht 5' 5" (1 651 m)   Wt 81 2 kg (179 lb)   SpO2 98%   BMI 29 79 kg/m²     Physical Exam  Vitals and nursing note reviewed  Constitutional:       Appearance: Normal appearance  He is well-developed  He is not ill-appearing  HENT:      Head: Normocephalic and atraumatic  Eyes:      Extraocular Movements: Extraocular movements intact  Conjunctiva/sclera: Conjunctivae normal       Pupils: Pupils are equal    Neck:        Comments: Trigger points identified   Cardiovascular:      Rate and Rhythm: Normal rate and regular rhythm  Heart sounds: S1 normal and S2 normal  No murmur heard  Pulmonary:      Effort: Pulmonary effort is normal  No respiratory distress  Breath sounds: Normal breath sounds  Musculoskeletal:      Right shoulder: Normal strength  Left shoulder: Tenderness present  No bony tenderness  Normal range of motion  Normal strength  Neurological:      Mental Status: He is alert and oriented to person, place, and time  Psychiatric:         Mood and Affect: Mood normal          Thought Content:  Thought content normal        Yari Tracey

## 2022-11-18 NOTE — ASSESSMENT & PLAN NOTE
Patient blood pressure is elevated but he has been out of his medications  Will restart his medications     Recheck in 3 weeks

## 2023-01-17 PROBLEM — Z12.5 SCREENING FOR PROSTATE CANCER: Status: RESOLVED | Noted: 2018-04-19 | Resolved: 2023-01-17

## 2023-01-27 ENCOUNTER — APPOINTMENT (OUTPATIENT)
Dept: RADIOLOGY | Facility: MEDICAL CENTER | Age: 63
End: 2023-01-27

## 2023-01-27 DIAGNOSIS — G89.29 CHRONIC PAIN OF BOTH SHOULDERS: ICD-10-CM

## 2023-01-27 DIAGNOSIS — M25.511 CHRONIC PAIN OF BOTH SHOULDERS: ICD-10-CM

## 2023-01-27 DIAGNOSIS — M25.512 CHRONIC PAIN OF BOTH SHOULDERS: ICD-10-CM

## 2023-02-01 DIAGNOSIS — M25.512 CHRONIC PAIN OF BOTH SHOULDERS: Primary | ICD-10-CM

## 2023-02-01 DIAGNOSIS — M25.511 CHRONIC PAIN OF BOTH SHOULDERS: Primary | ICD-10-CM

## 2023-02-01 DIAGNOSIS — G89.29 CHRONIC PAIN OF BOTH SHOULDERS: Primary | ICD-10-CM

## 2023-02-02 ENCOUNTER — TELEPHONE (OUTPATIENT)
Dept: FAMILY MEDICINE CLINIC | Facility: CLINIC | Age: 63
End: 2023-02-02

## 2023-02-02 DIAGNOSIS — M25.511 CHRONIC PAIN OF BOTH SHOULDERS: ICD-10-CM

## 2023-02-02 DIAGNOSIS — M79.10 TRIGGER POINT: ICD-10-CM

## 2023-02-02 DIAGNOSIS — M25.512 CHRONIC PAIN OF BOTH SHOULDERS: ICD-10-CM

## 2023-02-02 DIAGNOSIS — G89.29 CHRONIC PAIN OF BOTH SHOULDERS: ICD-10-CM

## 2023-02-02 RX ORDER — CYCLOBENZAPRINE HCL 10 MG
10 TABLET ORAL
Qty: 90 TABLET | Refills: 0 | Status: SHIPPED | OUTPATIENT
Start: 2023-02-02

## 2023-02-02 RX ORDER — DICLOFENAC SODIUM 75 MG/1
75 TABLET, DELAYED RELEASE ORAL 2 TIMES DAILY
Qty: 90 TABLET | Refills: 3 | Status: SHIPPED | OUTPATIENT
Start: 2023-02-02

## 2023-02-03 ENCOUNTER — TELEPHONE (OUTPATIENT)
Dept: FAMILY MEDICINE CLINIC | Facility: CLINIC | Age: 63
End: 2023-02-03

## 2023-02-03 NOTE — TELEPHONE ENCOUNTER
Patient came in person to advise that medications cyclobenzaprine and trazodone are not helping with pain and sleep  Patient will like a new recommendations

## 2023-02-06 NOTE — TELEPHONE ENCOUNTER
I gave him diclofenac and flexeril and he needs to see physical therapy as well  There nothing wrong with bones this seems muscle pains

## 2023-02-07 NOTE — TELEPHONE ENCOUNTER
Left detailed message in [place] citizen advising patient with Jeferson Delarosa below mentioned recommendations

## 2023-02-13 ENCOUNTER — HOSPITAL ENCOUNTER (EMERGENCY)
Facility: HOSPITAL | Age: 63
Discharge: HOME | End: 2023-02-13
Attending: INTERNAL MEDICINE | Admitting: INTERNAL MEDICINE

## 2023-02-13 VITALS
WEIGHT: 180.7 LBS | DIASTOLIC BLOOD PRESSURE: 107 MMHG | RESPIRATION RATE: 19 BRPM | SYSTOLIC BLOOD PRESSURE: 182 MMHG | OXYGEN SATURATION: 96 % | BODY MASS INDEX: 30.07 KG/M2 | HEART RATE: 92 BPM | TEMPERATURE: 99 F

## 2023-02-13 DIAGNOSIS — J06.9 URI (UPPER RESPIRATORY INFECTION): Primary | ICD-10-CM

## 2023-02-13 DIAGNOSIS — I10 ESSENTIAL HYPERTENSION: ICD-10-CM

## 2023-02-13 DIAGNOSIS — E78.2 MIXED HYPERLIPIDEMIA: ICD-10-CM

## 2023-02-13 RX ORDER — BENZONATATE 100 MG/1
100 CAPSULE ORAL EVERY 8 HOURS
Qty: 21 CAPSULE | Refills: 0 | Status: SHIPPED | OUTPATIENT
Start: 2023-02-13

## 2023-02-13 RX ORDER — ATORVASTATIN CALCIUM 20 MG/1
TABLET, FILM COATED ORAL
Qty: 90 TABLET | Refills: 0 | Status: SHIPPED | OUTPATIENT
Start: 2023-02-13

## 2023-02-13 RX ORDER — OLMESARTAN MEDOXOMIL AND HYDROCHLOROTHIAZIDE 20/12.5 20; 12.5 MG/1; MG/1
TABLET ORAL
Qty: 90 TABLET | Refills: 0 | Status: SHIPPED | OUTPATIENT
Start: 2023-02-13 | End: 2023-02-17 | Stop reason: SDUPTHER

## 2023-02-13 RX ORDER — AMLODIPINE BESYLATE 5 MG/1
5 TABLET ORAL DAILY
Qty: 90 TABLET | Refills: 0 | Status: SHIPPED | OUTPATIENT
Start: 2023-02-13 | End: 2023-02-17 | Stop reason: SDUPTHER

## 2023-02-13 RX ORDER — FLUTICASONE PROPIONATE 50 MCG
1 SPRAY, SUSPENSION (ML) NASAL DAILY
Qty: 16 G | Refills: 0 | Status: SHIPPED | OUTPATIENT
Start: 2023-02-13

## 2023-02-13 NOTE — ED PROVIDER NOTES
HPI: Patient is a 63 y o  male who presents with 3 days of fever, cough and fatigue which the patient describes at moderate  Nonproductive cough  Worse at night  Feels tired as he spent most the night coughing  The patient has not had contact with people with similar symptoms  The patient taken OTC medication with relief of symptoms  He is requesting prescription medicine for his cough  He is not interested in COVID testing as his COVID test was negative at home  He would like a note for work  No Known Allergies    Past Medical History:   Diagnosis Date   • Anxiety     last assessed 03/29/2017   • Chest pain 3/26/2021   • COVID-19 11/16/2020 11/12/2020- symptom onset with sore throat, fever, congestion, headache  • Diverticulitis of colon     last assessed 08/14/2012   • Hematuria 11/13/2014    Description: 6 mm right-sided kidney stone   • Heme positive stool 11/10/2014    Description: Referred to Dr Erna Marley 11/10/14   • Hiatal hernia     last assessed 08/14/2012   • IBS (irritable bowel syndrome)     last assessed 08/14/2012   • Multiple joint pain 4/19/2018   • Right shoulder pain 12/8/2014   • Tinea corporis 2/6/2020      Past Surgical History:   Procedure Laterality Date   • APPENDECTOMY     • GALLBLADDER SURGERY       Social History     Tobacco Use   • Smoking status: Never   • Smokeless tobacco: Never   Substance Use Topics   • Alcohol use: No   • Drug use: No       Nursing notes reviewed  Physical Exam:  ED Triage Vitals [02/13/23 0548]   Temperature Pulse Respirations Blood Pressure SpO2   99 °F (37 2 °C) 92 19 (!) 182/107 96 %      Temp Source Heart Rate Source Patient Position - Orthostatic VS BP Location FiO2 (%)   Tympanic Monitor Sitting Left arm --      Pain Score       No Pain           ROS: Positive for  fever, cough and fatigue, the remainder of a 10 organ system ROS was otherwise unremarkable    General: awake, alert, no acute distress    Head: normocephalic, atraumatic    Eyes: no scleral icterus  Ears: external ears normal, hearing grossly intact  Nose: external exam grossly normal, negative nasal discharge  Neck: symmetric, No JVD noted, trachea midline  Pulmonary: no respiratory distress, no tachypnea noted, lungs clear to auscultation bilaterally  Cardiovascular: appears well perfused, regular rate and rhythm  Abdomen: no distention noted  Musculoskeletal: no deformities noted, tone normal  Neuro: grossly non-focal  Psych: mood and affect appropriate    The patient is stable and has a history and physical exam consistent with a viral illness  COVID19 testing has not been performed  I considered the patient's other medical conditions as applicable/noted above in my medical decision making  The patient is stable upon discharge  The plan is for supportive care at home  Of note, patient was noted to be hypertensive in the ED  He reports he has not taken his medicine in the last 2 to 3 days  He was advised to take it and states he will take it this morning  The patient (and any family present) verbalized understanding of the discharge instructions and warnings that would necessitate return to the Emergency Department  All questions were answered prior to discharge  Medications - No data to display  Final diagnoses:   URI (upper respiratory infection)     Time reflects when diagnosis was documented in both MDM as applicable and the Disposition within this note     Time User Action Codes Description Comment    2/13/2023  5:51 AM Adam Monsivais Add [J06 9] URI (upper respiratory infection)       ED Disposition     ED Disposition   Discharge    Condition   Stable    Date/Time   Mon Feb 13, 2023  5:51 AM    Comment   Princess Santos discharge to home/self care                 Follow-up Information     Follow up With Specialties Details Why [address], [address], Nurse Practitioner   [address]  [address] [facility]  Juventino Barboza   49  [id number] [phone] Patient's Medications   Discharge Prescriptions    BENZONATATE (TESSALON PERLES) 100 MG CAPSULE    Take 1 capsule (100 mg total) by mouth every 8 (eight) hours       Start Date: 2/13/2023 End Date: --       Order Dose: 100 mg       Quantity: 21 capsule    Refills: 0    FLUTICASONE (FLONASE) 50 MCG/ACT NASAL SPRAY    1 spray into each nostril daily       Start Date: 2/13/2023 End Date: --       Order Dose: 1 spray       Quantity: 16 g    Refills: 0     No discharge procedures on file      Electronically Signed by       Maile Baker MD  02/13/23 9629

## 2023-02-13 NOTE — Clinical Note
Natalia Capellan was seen and treated in our emergency department on 2/13/2023  Diagnosis:     Zachariah    He may return on this date: 02/14/2023         If you have any questions or concerns, please don't hesitate to call        Dangelo Ray MD    ______________________________           _______________          _______________  [facility] Representative                              Date                                Time

## 2023-02-13 NOTE — Clinical Note
accompanied Princess Santos to the emergency department on 2/13/2023  Return date if applicable: 37/09/7327        If you have any questions or concerns, please don't hesitate to call        Checo Henson RN

## 2023-02-13 NOTE — ED NOTES
Provider aware of patient's BP  Per patient he has not taken his BP meds for 2 days  Patient states he will take his BP meds  Provider aware  Patient denies chest pain, headache, blurred vision               Radha Rolon RN  02/13/23 4187

## 2023-02-13 NOTE — Clinical Note
Lucy Kern was seen and treated in our emergency department on 2/13/2023  Diagnosis:     Zachariah    He may return on this date: 02/14/2023         If you have any questions or concerns, please don't hesitate to call        Jeanette Potts MD    ______________________________           _______________          _______________  [facility] Representative                              Date                                Time

## 2023-02-16 NOTE — PROGRESS NOTES
PT Evaluation     Today's date: 2023  Patient name: Bouchra Penn  : 1960  MRN: [id number]  Referring provider: KORINA Renner  Dx:   Encounter Diagnosis     ICD-10-CM    1  Strain of rotator cuff capsule, unspecified laterality, subsequent encounter  S46 019D       2  Chronic pain of both shoulders  M25 511 Ambulatory Referral to Physical Therapy    G89 29     M25 512           Start Time: 1330  Stop Time: 1410  Total time in clinic (min): 40 minutes    Assessment  Assessment details: Bouchra Penn is a 63 y o  male who presents to PT evaluation for chronic b/l shoulder pain  Examination suggestive of b/l SAPS due to rotator cuff strain  He demonstrates the following impairments: b/l shoulder pain, decreased ROM, weakness, activity tolerance, (+) rotator cuff tendinopathy cluster  The listed impairments limit the individuals ability to perform the following: overhead lifting, work related duties as a   HEP was provided and reviewed  Patient is able to complete HEP with good technique and appropriate pain response  Patient expressed understanding of appropriate dosage and frequency of HEP  No additional referral necessary  Pt would benefit from skilled PT to improve upon impairments to improve QoL  Impairments: abnormal or restricted ROM, activity intolerance, impaired physical strength, lacks appropriate home exercise program and pain with function    Symptom irritability: moderateUnderstanding of Dx/Px/POC: excellent  Goals  Short Term Goals: In 2-4 weeks, the patient will:  1  Will demonstrate 0 5 point improvement w/ b/l MMT  2  Will demonstrate painfree arc of motion b/l   3  Supervision with HEP for self care    Long Term Goals: At time of D/C, the patient will:  1  Pt will be able to single arm OH press 18#kb to simulate lifting the forklift gas door  2  FOTO to greater than predicted value  3   Independent with HEP for selfcare      Plan  Patient would benefit from: skilled physical therapy  Planned modality interventions: cryotherapy, biofeedback, electrical stimulation/Tunisian stimulation, manual electrical stimulation, microcurrent electrical stimulation, TENS and thermotherapy: hydrocollator packs  Planned therapy interventions: activity modification, abdominal trunk stabilization, balance/weight bearing training, body mechanics training, flexibility, functional ROM exercises, gait training, graded activity, graded motor, home exercise program, joint mobilization, manual therapy, neuromuscular re-education, patient education, postural training, strengthening, stretching, therapeutic activities and therapeutic exercise  Frequency: 1x week  Duration in visits: 8  Duration in weeks: 8  Treatment plan discussed with: patient        Subjective Evaluation    History of Present Illness  Mechanism of injury: Pt reports to PT evaluation for b/l shoulder pain (L>R) that began 6-7 months ago  He denies any ELISA but that he has constant shoulder pain that is worse when sleeping  He states that when he lifts to shoulder height that he does not have pain, but that he has significant shoulder pain when he has to lift overhead  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 as well as opening the door overhead  He also states that his shoulder typically do feel less pain because they are "warm " He notes that he was given medication     Pain  Current pain ratin  At best pain ratin  At worst pain ratin  Location: b/l shoulders (L>R)  Relieving factors: medications  Aggravating factors: overhead activity  Progression: worsening      Diagnostic Tests  X-ray: normal  Treatments  Previous treatment: medication  Current treatment: medication  Patient Goals  Patient goals for therapy: decreased pain, increased motion, independence with ADLs/IADLs, return to work and increased strength  Patient goal: improve his shoulder pain so that he can be more comfrtable at work, improve sleep        Objective    1:1 with Jamie Copping, DPT for entirety of tx  OBJECTIVE:    Standing         Head Position x Protracted  Neutral  Retracted   Scapular Position  Protracted x Neutral  Retracted   Thoracic Spine  Inc Kyphosis x Neutral       Strength and ROM evaluated B from a regional biomechanical perspective and values relevant to this episode recorded in table below    ROM: Goniometric measurement revealed the following findings  Shoulder ROM Right: Left:    Flexion 180 180*   Abduction 180* 180*    ER T3 T2   IR L2 L4     Strength: MMT revealed the following findings  Joint Motion Right:  Left:    Sh  Flexion 4/5 4/5*   Sh  Abduction 4/5* 4/5*   Sh  ER 4/5* 4/5   Sh  IR 4+/5 4+/5     Additional Assessments:  Palpation: L greater tuberosity, supraspinatus tendon  Cervical AROM WNL    Shoulder Specific Special Tests: For clinical decision making the Zaslov Test [facility] SUPER[facility]) was used initially: Results: ER>>IR MMT which suggests IR-articular disorder                                                                                                                                                                Test / Measure  Right: Left:   Esquivel-Yonny P P   Painful Arc P P   Infraspinatus Strength Test P P   Drop Arm N N   Supraspinatus (empty can) P P     Flowsheet Rows    Flowsheet Row Most Recent Value   PT/OT G-Codes    Current Score 49   Projected Score 69           Precautions: HTN, insomnia, mild depression, anxiety    Pertinent Findings:                                                                                                                                                     Test / Measure  02/17/23   FOTO (pred 69) 49   B/L shoulder MMT 4/5*   SAPS Cluster (Hip) P         Manuals 2/17                                                                Neuro Re-Ed 2/17            Shoulder ISO:   ER, ABD, Ext 10"x3 ea Towels Slides @ Wall x10 b/l            No Money BTB x10            Ir Str c Towel  NV           Ball Wall Circles  NV           Body Blade                          Ther Ex 2/17            UBE  NV           Standing ITYs  NV           Sidon: Rows/Ext  NV           Sidon: IR/ER  NV           Sidon: Facepulls  NV                                                  Ther Activity 2/17            Box Carry: Floor to [employer] Carry: Mat to Overhead             90/90 [employer] 2/17                                      Modalities 2/17

## 2023-02-17 ENCOUNTER — EVALUATION (OUTPATIENT)
Dept: PHYSICAL THERAPY | Facility: CLINIC | Age: 63
End: 2023-02-17

## 2023-02-17 ENCOUNTER — OFFICE VISIT (OUTPATIENT)
Dept: FAMILY MEDICINE CLINIC | Facility: CLINIC | Age: 63
End: 2023-02-17

## 2023-02-17 VITALS
BODY MASS INDEX: 30.32 KG/M2 | WEIGHT: 182 LBS | HEIGHT: 65 IN | HEART RATE: 82 BPM | OXYGEN SATURATION: 98 % | DIASTOLIC BLOOD PRESSURE: 80 MMHG | SYSTOLIC BLOOD PRESSURE: 134 MMHG

## 2023-02-17 DIAGNOSIS — S46.019D STRAIN OF ROTATOR CUFF CAPSULE, UNSPECIFIED LATERALITY, SUBSEQUENT ENCOUNTER: Primary | ICD-10-CM

## 2023-02-17 DIAGNOSIS — I10 ESSENTIAL HYPERTENSION: ICD-10-CM

## 2023-02-17 DIAGNOSIS — M25.512 CHRONIC PAIN OF BOTH SHOULDERS: ICD-10-CM

## 2023-02-17 DIAGNOSIS — E78.2 MIXED HYPERLIPIDEMIA: ICD-10-CM

## 2023-02-17 DIAGNOSIS — M25.511 CHRONIC PAIN OF BOTH SHOULDERS: Primary | ICD-10-CM

## 2023-02-17 DIAGNOSIS — M25.512 CHRONIC PAIN OF BOTH SHOULDERS: Primary | ICD-10-CM

## 2023-02-17 DIAGNOSIS — Z12.11 SCREEN FOR COLON CANCER: ICD-10-CM

## 2023-02-17 DIAGNOSIS — G89.29 CHRONIC PAIN OF BOTH SHOULDERS: ICD-10-CM

## 2023-02-17 DIAGNOSIS — M25.511 CHRONIC PAIN OF BOTH SHOULDERS: ICD-10-CM

## 2023-02-17 DIAGNOSIS — J20.9 ACUTE BRONCHITIS, UNSPECIFIED ORGANISM: ICD-10-CM

## 2023-02-17 DIAGNOSIS — G89.29 CHRONIC PAIN OF BOTH SHOULDERS: Primary | ICD-10-CM

## 2023-02-17 LAB
SARS-COV-2 AG UPPER RESP QL IA: NEGATIVE
VALID CONTROL: NORMAL

## 2023-02-17 RX ORDER — AMLODIPINE BESYLATE 5 MG/1
5 TABLET ORAL DAILY
Qty: 90 TABLET | Refills: 0 | Status: SHIPPED | OUTPATIENT
Start: 2023-02-17 | End: 2023-03-08 | Stop reason: SDUPTHER

## 2023-02-17 RX ORDER — OLMESARTAN MEDOXOMIL AND HYDROCHLOROTHIAZIDE 20/12.5 20; 12.5 MG/1; MG/1
1 TABLET ORAL DAILY
Qty: 90 TABLET | Refills: 1 | Status: SHIPPED | OUTPATIENT
Start: 2023-02-17 | End: 2023-03-08 | Stop reason: SDUPTHER

## 2023-02-17 RX ORDER — CAMPHOR, MENTHOL, METHYL SALICYLATE 21.56; 41.73; 69.55 MG/1; MG/1; MG/1
1 PATCH PERCUTANEOUS; TOPICAL; TRANSDERMAL EVERY 12 HOURS PRN
COMMUNITY
Start: 2023-02-17

## 2023-02-17 RX ORDER — AZITHROMYCIN 250 MG/1
TABLET, FILM COATED ORAL
Qty: 6 TABLET | Refills: 0 | Status: SHIPPED | OUTPATIENT
Start: 2023-02-17 | End: 2023-02-22

## 2023-02-17 RX ORDER — GUAIFENESIN 600 MG/1
1200 TABLET, EXTENDED RELEASE ORAL EVERY 12 HOURS SCHEDULED
Qty: 60 TABLET | Refills: 0 | Status: SHIPPED | OUTPATIENT
Start: 2023-02-17

## 2023-02-17 NOTE — ASSESSMENT & PLAN NOTE
Patient xrays were normal  He given flexeril and voltaren tabs  He has a physical therapy appt late this afternoon  The medications prescribed have been helpful

## 2023-02-17 NOTE — PROGRESS NOTES
Name: Raza Echevarria      : 1960      MRN: [id number]  Encounter Provider: KORINA Meadows  Encounter Date: 2023   Encounter department: [facility] PRIMARY [facility]    Assessment & Plan     1  Chronic pain of both shoulders  Assessment & Plan:  Patient xrays were normal  He given flexeril and voltaren tabs  He has a physical therapy appt late this afternoon  The medications prescribed have been helpful  Orders:  -     Salonpas 3 1-6-10 % PTCH; Apply 1 patch topically every 12 (twelve) hours as needed (shoulder pain)    2  Essential hypertension  Assessment & Plan:  Patient blood pressure is acceptable  Continue same medication     Orders:  -     olmesartan-hydrochlorothiazide (BENICAR HCT) 20-12 5 MG per tablet; Take 1 tablet by mouth daily  -     amLODIPine (NORVASC) 5 mg tablet; Take 1 tablet (5 mg total) by mouth daily    3  Mixed hyperlipidemia  Assessment & Plan:  Has not done labs need to do labs  4  Screen for colon cancer  -     Pierre    5  Acute bronchitis, unspecified organism  Comments:  given congestion findings in his chest exam today would be reasonable to start abx  rapid covid in office was negative   Orders:  -     azithromycin (ZITHROMAX) 250 mg tablet; Take 2 tablets today then 1 tablet daily x 4 days  -     guaiFENesin (MUCINEX) 600 mg 12 hr tablet; Take 2 tablets (1,200 mg total) by mouth every 12 (twelve) hours  -     POCT Rapid Covid Ag      BMI Counseling: Body mass index is 30 29 kg/m²  The BMI is above normal  Nutrition recommendations include decreasing portion sizes, moderation in carbohydrate intake, reducing intake of saturated and trans fat and reducing intake of cholesterol  Exercise recommendations include moderate physical activity 150 minutes/week and strength training exercises  Rationale for BMI follow-up plan is due to patient being overweight or obese  Subjective      Patient presents for 2 week follow up on his shoulder pain   He was written an accomodation letter for work  He states his job is not adhering to his restrictions  Patient was ordered to complete physical therapy but has an appt later today  He was prescribed muscle relaxer and nsaid which has been helpful  He also reports he was in the ER for cold  He has been sick since 2/10  He was 3 days in bed and decided to go to the ER  He had a fever the first day  He reports he did covid test at home which was negative  The er did not repeat this  He was given flonase and tessalon    He is not feeling any better    Also patient reports that he ran out of his blood pressure medications  Review of Systems   Constitutional: Positive for fatigue  Negative for chills, diaphoresis and fever  HENT: Positive for congestion, postnasal drip and rhinorrhea  Negative for sore throat  Respiratory: Positive for cough and shortness of breath (resolved now )  Negative for chest tightness  Gastrointestinal: Negative for diarrhea, nausea and vomiting  Musculoskeletal: Positive for arthralgias and myalgias  Neurological: Positive for headaches  Current Outpatient Medications on File Prior to Visit   Medication Sig   • atorvastatin (LIPITOR) 20 mg tablet TOME JIAN TABLETA TODOS LOS SANDY   • benzonatate (TESSALON PERLES) 100 mg capsule Take 1 capsule (100 mg total) by mouth every 8 (eight) hours   • cyclobenzaprine (FLEXERIL) 10 mg tablet Take 1 tablet (10 mg total) by mouth daily at bedtime   • diclofenac (VOLTAREN) 75 mg EC tablet Take 1 tablet (75 mg total) by mouth 2 (two) times a day   • fluticasone (FLONASE) 50 mcg/act nasal spray 1 spray into each nostril daily       Objective     /80 (BP Location: Left arm, Patient Position: Sitting, Cuff Size: Large)   Pulse 82   Ht 5' 5" (1 651 m)   Wt 82 6 kg (182 lb)   SpO2 98%   BMI 30 29 kg/m²     Physical Exam  Vitals and nursing note reviewed  Constitutional:       Appearance: Normal appearance  He is well-developed   He is not ill-appearing  HENT:      Head: Normocephalic and atraumatic  Right Ear: Tympanic membrane normal       Left Ear: Tympanic membrane normal       Nose: Mucosal edema and rhinorrhea present  Rhinorrhea is clear  Mouth/Throat:      Lips: Pink  Mouth: Mucous membranes are moist    Eyes:      Extraocular Movements: Extraocular movements intact  Conjunctiva/sclera: Conjunctivae normal       Pupils: Pupils are equal    Cardiovascular:      Rate and Rhythm: Normal rate and regular rhythm  Heart sounds: S1 normal and S2 normal  No murmur heard  Pulmonary:      Effort: Pulmonary effort is normal  No respiratory distress  Breath sounds: Examination of the right-upper field reveals wheezing and rhonchi  Examination of the left-upper field reveals wheezing and rhonchi  Wheezing and rhonchi present  Musculoskeletal:      Right shoulder: Tenderness (biceps tendon bilaterally ) present  Left shoulder: Tenderness present  Neurological:      Mental Status: He is alert and oriented to person, place, and time  Psychiatric:         Mood and Affect: Mood normal          Thought Content:  Thought content normal           Peggy Bergman

## 2023-02-22 NOTE — TELEPHONE ENCOUNTER
----- Message from Viviane eBllamy sent at 2/1/2023  3:08 PM EST -----  Pt was notified of results, is interested in doing PT  Also questioning if you can rx any medication for pt, pt states he is in a lot of pain  Also states with work if he can have a note for restrictions ? Also requested someone Mohawk speaking to call him back with your response  Was A Bandage Applied: Yes Size Of Lesion In Cm (Optional): 0 Punch Size In Mm: 3 Render Path Notes In Note?: No Consent: Written consent was obtained and risks were reviewed including but not limited to scarring, infection, bleeding, scabbing, incomplete removal, nerve damage and allergy to anesthesia. Epidermal Sutures: 4-0 Ethilon Home Suture Removal Text: Patient was provided a home suture removal kit and will remove their sutures at home.  If they have any questions or difficulties they will call the office. Depth Of Punch Biopsy: dermis Information: Selecting Yes will display possible errors in your note based on the variables you have selected. This validation is only offered as a suggestion for you. PLEASE NOTE THAT THE VALIDATION TEXT WILL BE REMOVED WHEN YOU FINALIZE YOUR NOTE. IF YOU WANT TO FAX A PRELIMINARY NOTE YOU WILL NEED TO TOGGLE THIS TO 'NO' IF YOU DO NOT WANT IT IN YOUR FAXED NOTE. Anesthesia Type: 1% lidocaine with epinephrine Hemostasis: None Post-Care Instructions: I reviewed with the patient in detail post-care instructions. Patient is to keep the biopsy site dry overnight, and then apply bacitracin twice daily until healed. Patient may apply hydrogen peroxide soaks to remove any crusting. Anesthesia Volume In Cc (Will Not Render If 0): 0.5 Detail Level: Detailed Dressing: bandage Billing Type: Third-Party Bill Wound Care: Petrolatum Suture Removal: 14 days Notification Instructions: Patient will be notified of biopsy results. However, patient instructed to call the office if not contacted within 2 weeks. Biopsy Type: H and E

## 2023-03-08 ENCOUNTER — TELEPHONE (OUTPATIENT)
Dept: FAMILY MEDICINE CLINIC | Facility: CLINIC | Age: 63
End: 2023-03-08

## 2023-03-08 DIAGNOSIS — M25.512 CHRONIC PAIN OF BOTH SHOULDERS: ICD-10-CM

## 2023-03-08 DIAGNOSIS — G89.29 CHRONIC PAIN OF BOTH SHOULDERS: ICD-10-CM

## 2023-03-08 DIAGNOSIS — M25.511 CHRONIC PAIN OF BOTH SHOULDERS: ICD-10-CM

## 2023-03-08 DIAGNOSIS — I10 ESSENTIAL HYPERTENSION: ICD-10-CM

## 2023-03-08 DIAGNOSIS — M79.10 TRIGGER POINT: ICD-10-CM

## 2023-03-08 RX ORDER — CYCLOBENZAPRINE HCL 10 MG
10 TABLET ORAL
Qty: 90 TABLET | Refills: 0 | Status: SHIPPED | OUTPATIENT
Start: 2023-03-08

## 2023-03-08 RX ORDER — AMLODIPINE BESYLATE 5 MG/1
5 TABLET ORAL DAILY
Qty: 90 TABLET | Refills: 0 | Status: SHIPPED | OUTPATIENT
Start: 2023-03-08

## 2023-03-08 RX ORDER — OLMESARTAN MEDOXOMIL AND HYDROCHLOROTHIAZIDE 20/12.5 20; 12.5 MG/1; MG/1
1 TABLET ORAL DAILY
Qty: 90 TABLET | Refills: 1 | Status: SHIPPED | OUTPATIENT
Start: 2023-03-08

## 2023-03-08 RX ORDER — DICLOFENAC SODIUM 75 MG/1
75 TABLET, DELAYED RELEASE ORAL 2 TIMES DAILY
Qty: 90 TABLET | Refills: 3 | Status: SHIPPED | OUTPATIENT
Start: 2023-03-08

## 2023-03-08 NOTE — TELEPHONE ENCOUNTER
Patient called in advising he never received his blood pressure medication that was sent to [facility] pharmacy on 2/17/23 after his office visit  On hold for 15 min Rx were resent to pharmacy

## 2023-04-21 ENCOUNTER — TELEPHONE (OUTPATIENT)
Dept: FAMILY MEDICINE CLINIC | Facility: CLINIC | Age: 63
End: 2023-04-21

## 2023-04-21 DIAGNOSIS — I10 ESSENTIAL HYPERTENSION: ICD-10-CM

## 2023-04-21 DIAGNOSIS — J20.9 ACUTE BRONCHITIS, UNSPECIFIED ORGANISM: ICD-10-CM

## 2023-04-21 DIAGNOSIS — M79.10 TRIGGER POINT: ICD-10-CM

## 2023-04-21 DIAGNOSIS — M25.512 CHRONIC PAIN OF BOTH SHOULDERS: ICD-10-CM

## 2023-04-21 DIAGNOSIS — G89.29 CHRONIC PAIN OF BOTH SHOULDERS: ICD-10-CM

## 2023-04-21 DIAGNOSIS — M25.511 CHRONIC PAIN OF BOTH SHOULDERS: ICD-10-CM

## 2023-04-21 NOTE — TELEPHONE ENCOUNTER
Patient came into the office requesting a refill on the medication diclofenac and norvasc  Patient mention he's been having a cough as well he isn't able to sleep at night due to the cough  Patient would like it to be sent to [facility] pharmacy on [address] Please advise  Thank You

## 2023-04-26 NOTE — TELEPHONE ENCOUNTER
Last appt was 2/2023 with no current labs   Do you wish to refill or have him in for the cough first?

## 2023-04-27 RX ORDER — GUAIFENESIN 600 MG/1
1200 TABLET, EXTENDED RELEASE ORAL EVERY 12 HOURS SCHEDULED
Qty: 60 TABLET | Refills: 0 | Status: SHIPPED | OUTPATIENT
Start: 2023-04-27

## 2023-04-27 RX ORDER — DICLOFENAC SODIUM 75 MG/1
75 TABLET, DELAYED RELEASE ORAL 2 TIMES DAILY
Qty: 90 TABLET | Refills: 3 | Status: SHIPPED | OUTPATIENT
Start: 2023-04-27

## 2023-04-27 RX ORDER — AMLODIPINE BESYLATE 5 MG/1
5 TABLET ORAL DAILY
Qty: 90 TABLET | Refills: 0 | Status: SHIPPED | OUTPATIENT
Start: 2023-04-27

## 2023-08-10 ENCOUNTER — OFFICE VISIT (OUTPATIENT)
Dept: FAMILY MEDICINE CLINIC | Facility: CLINIC | Age: 63
End: 2023-08-10
Payer: COMMERCIAL

## 2023-08-10 VITALS
RESPIRATION RATE: 18 BRPM | BODY MASS INDEX: 29.82 KG/M2 | WEIGHT: 179 LBS | HEART RATE: 68 BPM | OXYGEN SATURATION: 95 % | HEIGHT: 65 IN | SYSTOLIC BLOOD PRESSURE: 150 MMHG | DIASTOLIC BLOOD PRESSURE: 80 MMHG

## 2023-08-10 DIAGNOSIS — K64.9 HEMORRHOIDS, UNSPECIFIED HEMORRHOID TYPE: ICD-10-CM

## 2023-08-10 DIAGNOSIS — I20.9 ANGINA PECTORIS (HCC): Primary | ICD-10-CM

## 2023-08-10 DIAGNOSIS — E78.2 MIXED HYPERLIPIDEMIA: ICD-10-CM

## 2023-08-10 DIAGNOSIS — K59.00 CONSTIPATION, UNSPECIFIED CONSTIPATION TYPE: ICD-10-CM

## 2023-08-10 DIAGNOSIS — Z78.9 LANGUAGE BARRIER AFFECTING HEALTH CARE: ICD-10-CM

## 2023-08-10 DIAGNOSIS — G47.00 INSOMNIA, UNSPECIFIED TYPE: ICD-10-CM

## 2023-08-10 DIAGNOSIS — I10 PRIMARY HYPERTENSION: ICD-10-CM

## 2023-08-10 PROBLEM — E78.5 HYPERLIPIDEMIA: Status: ACTIVE | Noted: 2018-06-18

## 2023-08-10 PROBLEM — Z60.3 LANGUAGE BARRIER AFFECTING HEALTH CARE: Status: ACTIVE | Noted: 2023-08-10

## 2023-08-10 PROBLEM — Z75.8 LANGUAGE BARRIER AFFECTING HEALTH CARE: Status: ACTIVE | Noted: 2023-08-10

## 2023-08-10 PROCEDURE — 99215 OFFICE O/P EST HI 40 MIN: CPT | Performed by: FAMILY MEDICINE

## 2023-08-10 PROCEDURE — 93000 ELECTROCARDIOGRAM COMPLETE: CPT | Performed by: FAMILY MEDICINE

## 2023-08-10 RX ORDER — CHOLECALCIFEROL (VITAMIN D3) 125 MCG
5 CAPSULE ORAL
Qty: 90 TABLET | Refills: 0 | Status: SHIPPED | OUTPATIENT
Start: 2023-08-10

## 2023-08-10 RX ORDER — OLMESARTAN MEDOXOMIL 20 MG/1
20 TABLET ORAL DAILY
Qty: 30 TABLET | Refills: 5 | Status: SHIPPED | OUTPATIENT
Start: 2023-08-10

## 2023-08-10 RX ORDER — DOCUSATE SODIUM 100 MG/1
100 CAPSULE, LIQUID FILLED ORAL DAILY
Qty: 90 CAPSULE | Refills: 3 | Status: SHIPPED | OUTPATIENT
Start: 2023-08-10

## 2023-08-10 RX ORDER — HYDROCORTISONE 25 MG/G
CREAM TOPICAL 2 TIMES DAILY
Qty: 28 G | Refills: 1 | Status: SHIPPED | OUTPATIENT
Start: 2023-08-10

## 2023-08-10 NOTE — ASSESSMENT & PLAN NOTE
Patient is having angina. Normal EKG today. Based on that, I would recommend a stress test.  Recommend stress test.  Follow-up with cardiology also would be recommended.

## 2023-08-10 NOTE — ASSESSMENT & PLAN NOTE
Minor issues regarding language barrier today. I did provide some translation to the patient for information. Mon[facility] the [facility] and [facility] and [facility].

## 2023-08-10 NOTE — ASSESSMENT & PLAN NOTE
Patient needs to have his labs done. Has been taking atorvastatin, but has not had blood work done in quite some time. Has a atorvastatin, but again needs the labs to be done.

## 2023-08-10 NOTE — PROGRESS NOTES
Name: Lexie Paniagua      : 1960      MRN: [id number]  Encounter Provider: Kassidy Messer MD  Encounter Date: 8/10/2023   Encounter department: [facility] [address]     1. Angina pectoris [facility])  Assessment & Plan:  Patient is having angina. Normal EKG today. Based on that, I would recommend a stress test.  Recommend stress test.  Follow-up with cardiology also would be recommended. Orders:  -     POCT ECG    2. Primary hypertension  Assessment & Plan:  Blood pressure is significantly elevated today, but the patient was not taking the Benicar HCT. He reports that he has to urinate too frequently when he takes that. Based on that, we will restart Benicar alone, without the HCT. 20 mg daily. Reevaluate based on that in 1 month. Strongly recommend that he get more blood work done, as this is critically important to make sure that he is not having any problems. Orders:  -     POCT ECG    3. Hemorrhoids, unspecified hemorrhoid type  Assessment & Plan:  Please see discussion of constipation. Hemorrhoid would be improved with using fiber, fluids, keeping the stool soft. 4. Constipation, unspecified constipation type  Assessment & Plan:  Increase fiber. Should help quite a bit with reducing the stress and strain of bowel movement. Increase water intake. Consider Colace. 5. Mixed hyperlipidemia  Assessment & Plan:  Patient needs to have his labs done. Has been taking atorvastatin, but has not had blood work done in quite some time. Has a atorvastatin, but again needs the labs to be done. Subjective      Problems with BM. Saturday, had problems. This AM, was washing, and noted a lump. Wondered what it was. Still needs to strain to have BM. During the week, when working, is OK with BM, but weekends, it is a problem. Has also noted some discomfort in the chest on the left. Is a pressure. Happens at random times.  Not related to exercise. Hypertension: Patient is not currently on medications, as he was on the Benicar/HCT previously, and noted that he had to go to the bathroom every 5 minutes or so for urination. This was not conducive to his employment, so he stopped using the medication. Review of Systems   Constitutional: Negative. HENT: Negative. Respiratory: Negative. Cardiovascular: Negative. Gastrointestinal: Positive for constipation. Negative for anal bleeding and blood in stool. Endocrine: Negative. Genitourinary: Negative. Musculoskeletal: Negative.         Current Outpatient Medications on File Prior to Visit   Medication Sig   • amLODIPine (NORVASC) 5 mg tablet Take 1 tablet (5 mg total) by mouth daily   • atorvastatin (LIPITOR) 20 mg tablet TOME JIAN TABLETA TODOS LOS SANDY   • diclofenac (VOLTAREN) 75 mg EC tablet Take 1 tablet (75 mg total) by mouth 2 (two) times a day   • [DISCONTINUED] olmesartan-hydrochlorothiazide (BENICAR HCT) 20-12.5 MG per tablet Take 1 tablet by mouth daily   • [DISCONTINUED] benzonatate (TESSALON PERLES) 100 mg capsule Take 1 capsule (100 mg total) by mouth every 8 (eight) hours (Patient not taking: Reported on 8/10/2023)   • [DISCONTINUED] cyclobenzaprine (FLEXERIL) 10 mg tablet Take 1 tablet (10 mg total) by mouth daily at bedtime (Patient not taking: Reported on 8/10/2023)   • [DISCONTINUED] fluticasone (FLONASE) 50 mcg/act nasal spray 1 spray into each nostril daily (Patient not taking: Reported on 8/10/2023)   • [DISCONTINUED] guaiFENesin (MUCINEX) 600 mg 12 hr tablet Take 2 tablets (1,200 mg total) by mouth every 12 (twelve) hours (Patient not taking: Reported on 8/10/2023)   • [DISCONTINUED] Salonpas 3.1-6-10 % PTCH Apply 1 patch topically every 12 (twelve) hours as needed (shoulder pain) (Patient not taking: Reported on 8/10/2023)       Objective     /80 (BP Location: Left arm, Patient Position: Sitting, Cuff Size: Large)   Pulse 68   Resp 18   Ht 5' 5" (1.651 m)   Wt 81.2 kg (179 lb)   SpO2 95%   BMI 29.79 kg/m²     Physical Exam  Vitals and nursing note reviewed. Constitutional:       Appearance: He is well-developed. HENT:      Head: Normocephalic and atraumatic. Cardiovascular:      Rate and Rhythm: Normal rate and regular rhythm. Pulses:           Carotid pulses are 2+ on the right side and 2+ on the left side. Heart sounds: Normal heart sounds. No murmur heard. No friction rub. No gallop. Pulmonary:      Effort: Pulmonary effort is normal. No respiratory distress. Breath sounds: Normal breath sounds. No wheezing or rales. Genitourinary:     Rectum: External hemorrhoid present. Musculoskeletal:      Cervical back: Normal range of motion and neck supple. I have spent a total time of 50 minutes on 08/10/23 in caring for this patient including Diagnostic results, Prognosis, Risks and benefits of tx options, Instructions for management, Patient and family education, Importance of tx compliance, Risk factor reductions, Impressions, Counseling / Coordination of care, Documenting in the medical record, Reviewing / ordering tests, medicine, procedures   and Obtaining or reviewing history  .     Jenni Mccartney MD

## 2023-08-10 NOTE — LETTER
August 10, 2023     Patient: Uriah Pepper  YOB: 1960  Date of Visit: 8/10/2023      To Whom it May Concern:    Uriah Pepper is under my professional care. Armaan Khoury was seen in my office on 8/10/2023. Armaan Khoury may return to work on 11Aug2023 . If you have any questions or concerns, please don't hesitate to call.          Sincerely,          Larissa Tuttle MD        CC: No Recipients

## 2023-08-10 NOTE — ASSESSMENT & PLAN NOTE
Blood pressure is significantly elevated today, but the patient was not taking the Benicar HCT. He reports that he has to urinate too frequently when he takes that. Based on that, we will restart Benicar alone, without the HCT. 20 mg daily. Reevaluate based on that in 1 month. Strongly recommend that he get more blood work done, as this is critically important to make sure that he is not having any problems.

## 2023-08-10 NOTE — ASSESSMENT & PLAN NOTE
Increase fiber. Should help quite a bit with reducing the stress and strain of bowel movement. Increase water intake. Consider Colace.

## 2023-08-10 NOTE — PATIENT INSTRUCTIONS
1. Angina pectoris [facility])  Assessment & Plan:  Patient is having angina. Normal EKG today. Based on that, I would recommend a stress test.  Recommend stress test.  Follow-up with cardiology also would be recommended. Orders:  -     POCT ECG  -     Ambulatory Referral to Cardiology; Future  -     NM myocardial perfusion spect (stress and/or rest); Future; Expected date: 08/10/2023    2. Primary hypertension  Assessment & Plan:  Blood pressure is significantly elevated today, but the patient was not taking the Benicar HCT. He reports that he has to urinate too frequently when he takes that. Based on that, we will restart Benicar alone, without the HCT. 20 mg daily. Reevaluate based on that in 1 month. Strongly recommend that he get more blood work done, as this is critically important to make sure that he is not having any problems. Orders:  -     POCT ECG  -     olmesartan (BENICAR) 20 mg tablet; Take 1 tablet (20 mg total) by mouth daily  -     Ambulatory Referral to Cardiology; Future    3. Hemorrhoids, unspecified hemorrhoid type  Assessment & Plan:  Please see discussion of constipation. Hemorrhoid would be improved with using fiber, fluids, keeping the stool soft. Orders:  -     docusate sodium (COLACE) 100 mg capsule; Take 1 capsule (100 mg total) by mouth in the morning  -     psyllium (METAMUCIL) 58.6 % packet; Take 1 packet by mouth daily  -     hydrocortisone (ANUSOL-HC) 2.5 % rectal cream; Apply topically 2 (two) times a day    4. Constipation, unspecified constipation type  Assessment & Plan:  Increase fiber. Should help quite a bit with reducing the stress and strain of bowel movement. Increase water intake. Consider Colace. Orders:  -     docusate sodium (COLACE) 100 mg capsule; Take 1 capsule (100 mg total) by mouth in the morning  -     psyllium (METAMUCIL) 58.6 % packet; Take 1 packet by mouth daily    5.  Mixed hyperlipidemia  Assessment & Plan:  Patient needs to have his labs done.  Has been taking atorvastatin, but has not had blood work done in quite some time. Has a atorvastatin, but again needs the labs to be done. 6. Insomnia, unspecified type  -     Melatonin 5 MG TABS; Take 1 tablet (5 mg total) by mouth daily at bedtime    7. Language barrier affecting health care  Assessment & Plan:  Minor issues regarding language barrier today. I did provide some translation to the patient for information. Monographs given to the patient in both [facility] and [facility] and [place]. COVID 19 Instructions    Kaylynn Acharya was advised to limit contact with others to essential tasks such as getting food, medications, and medical care. Proper handwashing reviewed, and Hand sanitzer when washing is not available. If the patient develops symptoms of COVID 19, the patient should call the office as soon as possible. For 9546-2597 Flu season, it is strongly recommended that Flu Vaccinations be obtained. Virtual Visits:  [place]: This works on smart phones (any phone with Internet browsing capability). You should get a text message when the provider is ready to see you. Click on the link in the text message, and the call should start. You will need to type in your name, and allow camera and microphone access. This is HIPPA compliant, and secure. If you have not already done so, get immunized to COVID 19. We are committed to getting you vaccinated as soon as possible and will be closely following CDC and [facility] guidelines as they are released and revised. Please refer to our COVID-19 vaccine webpage for the most up to date information on the vaccine and our distribution efforts. This site will also have the most up to date recommendations for COVID booster vaccine.     [email or web address]    Call [phone] ([phone]), option 7    OUR NEW LOCATION:    [address] [place] [address] 380 30 [facility],[address], [address] [place] [address], [address] [place] [address]  Fax: [phone]    Lab services and OB/GYN are at this location as well.

## 2023-08-10 NOTE — ASSESSMENT & PLAN NOTE
Please see discussion of constipation. Hemorrhoid would be improved with using fiber, fluids, keeping the stool soft.

## 2023-08-25 DIAGNOSIS — I10 ESSENTIAL HYPERTENSION: ICD-10-CM

## 2023-08-25 RX ORDER — AMLODIPINE BESYLATE 5 MG/1
5 TABLET ORAL DAILY
Qty: 90 TABLET | Refills: 0 | Status: SHIPPED | OUTPATIENT
Start: 2023-08-25

## 2023-11-24 DIAGNOSIS — M25.512 CHRONIC PAIN OF BOTH SHOULDERS: ICD-10-CM

## 2023-11-24 DIAGNOSIS — M25.511 CHRONIC PAIN OF BOTH SHOULDERS: ICD-10-CM

## 2023-11-24 DIAGNOSIS — M79.10 TRIGGER POINT: ICD-10-CM

## 2023-11-24 DIAGNOSIS — G89.29 CHRONIC PAIN OF BOTH SHOULDERS: ICD-10-CM

## 2023-11-24 RX ORDER — DICLOFENAC SODIUM 75 MG/1
TABLET, DELAYED RELEASE ORAL
Qty: 90 TABLET | Refills: 3 | Status: SHIPPED | OUTPATIENT
Start: 2023-11-24

## 2023-11-26 DIAGNOSIS — I10 ESSENTIAL HYPERTENSION: ICD-10-CM

## 2023-11-27 RX ORDER — AMLODIPINE BESYLATE 5 MG/1
5 TABLET ORAL DAILY
Qty: 90 TABLET | Refills: 0 | Status: SHIPPED | OUTPATIENT
Start: 2023-11-27

## 2024-02-02 ENCOUNTER — OFFICE VISIT (OUTPATIENT)
Dept: FAMILY MEDICINE CLINIC | Facility: CLINIC | Age: 64
End: 2024-02-02
Payer: COMMERCIAL

## 2024-02-02 ENCOUNTER — APPOINTMENT (OUTPATIENT)
Dept: LAB | Facility: HOSPITAL | Age: 64
End: 2024-02-02
Payer: COMMERCIAL

## 2024-02-02 VITALS
HEIGHT: 65 IN | DIASTOLIC BLOOD PRESSURE: 84 MMHG | SYSTOLIC BLOOD PRESSURE: 156 MMHG | WEIGHT: 183 LBS | OXYGEN SATURATION: 96 % | HEART RATE: 74 BPM | BODY MASS INDEX: 30.49 KG/M2

## 2024-02-02 DIAGNOSIS — E78.2 MIXED HYPERLIPIDEMIA: ICD-10-CM

## 2024-02-02 DIAGNOSIS — R94.120 FAILED HEARING SCREENING: Primary | ICD-10-CM

## 2024-02-02 DIAGNOSIS — G47.00 INSOMNIA, UNSPECIFIED TYPE: ICD-10-CM

## 2024-02-02 DIAGNOSIS — R41.3 MEMORY CHANGES: ICD-10-CM

## 2024-02-02 DIAGNOSIS — I10 PRIMARY HYPERTENSION: ICD-10-CM

## 2024-02-02 DIAGNOSIS — F32.A MILD DEPRESSION: ICD-10-CM

## 2024-02-02 DIAGNOSIS — I20.9 ANGINA PECTORIS (HCC): ICD-10-CM

## 2024-02-02 DIAGNOSIS — F41.9 ANXIETY: ICD-10-CM

## 2024-02-02 DIAGNOSIS — M25.511 CHRONIC PAIN OF BOTH SHOULDERS: ICD-10-CM

## 2024-02-02 DIAGNOSIS — G89.29 CHRONIC PAIN OF BOTH SHOULDERS: ICD-10-CM

## 2024-02-02 DIAGNOSIS — M25.512 CHRONIC PAIN OF BOTH SHOULDERS: ICD-10-CM

## 2024-02-02 PROBLEM — R76.8 POSITIVE ANA (ANTINUCLEAR ANTIBODY): Status: RESOLVED | Noted: 2018-06-18 | Resolved: 2024-02-02

## 2024-02-02 PROCEDURE — 99214 OFFICE O/P EST MOD 30 MIN: CPT | Performed by: NURSE PRACTITIONER

## 2024-02-02 RX ORDER — DICLOFENAC SODIUM 75 MG/1
75 TABLET, DELAYED RELEASE ORAL 2 TIMES DAILY
Qty: 90 TABLET | Refills: 3 | Status: SHIPPED | OUTPATIENT
Start: 2024-02-02

## 2024-02-02 RX ORDER — AMLODIPINE BESYLATE 5 MG/1
5 TABLET ORAL DAILY
Qty: 90 TABLET | Refills: 0 | Status: SHIPPED | OUTPATIENT
Start: 2024-02-02

## 2024-02-02 RX ORDER — OLMESARTAN MEDOXOMIL 20 MG/1
20 TABLET ORAL DAILY
Qty: 90 TABLET | Refills: 0 | Status: SHIPPED | OUTPATIENT
Start: 2024-02-02

## 2024-02-02 RX ORDER — HYDROXYZINE PAMOATE 25 MG/1
25 CAPSULE ORAL
Qty: 90 CAPSULE | Refills: 0 | Status: SHIPPED | OUTPATIENT
Start: 2024-02-02

## 2024-02-02 RX ORDER — ATORVASTATIN CALCIUM 20 MG/1
20 TABLET, FILM COATED ORAL DAILY
Qty: 90 TABLET | Refills: 0 | Status: SHIPPED | OUTPATIENT
Start: 2024-02-02

## 2024-02-02 NOTE — PROGRESS NOTES
Name: Zachariah Lacey      : 1960      MRN: [id number]  Encounter Provider: KORINA Palomino  Encounter Date: 2024   Encounter department: [facility]ARE    Assessment & Plan     1. Failed hearing screening  Assessment & Plan:  Patient reports he failed hearing screen and needs to have follow up care per his job or else he can lose his job. There are no testing records available today   Refer to audiology     Orders:  -     Ambulatory Referral to Audiology; Future    2. Chronic pain of both shoulders  Assessment & Plan:  Patient uses voltaren as needed with good relief. This was refilled today.     Orders:  -     diclofenac (VOLTAREN) 75 mg EC tablet; Take 1 tablet (75 mg total) by mouth 2 (two) times a day    3. Memory changes  Assessment & Plan:  Patient reports he having increasing issues with memory and forgetfulness in his daily activities. I have ordered some labs but will bring him back for memory testing in [place]     Orders:  -     TSH, 3rd generation with Free T4 reflex; Future; Expected date: 03/15/2024  -     Vitamin B12; Future    4. Angina pectoris (HCC)  Assessment & Plan:  Patient continues to have chest pain. His blood pressure is elevated today as well patient was ordered to have stress test and see cardiology and he did neither. Refer back to cardiology.     Orders:  -     Ambulatory Referral to Cardiology; Future  -     Lipid Panel with Direct LDL reflex; Future  -     TSH, 3rd generation with Free T4 reflex; Future; Expected date: 03/15/2024  -     CBC and differential; Future    5. Primary hypertension  Assessment & Plan:  Patient blood pressure is elevated but he has been without his medications. I have reordered his amlodipine 5  and olmesartan 20mg   I will see him back in 6 weeks.     Orders:  -     Ambulatory Referral to Cardiology; Future  -     TSH, 3rd generation with Free T4 reflex; Future; Expected date: 03/15/2024  -     amLODIPine (NORVASC) 5 mg  tablet; Take 1 tablet (5 mg total) by mouth daily  -     olmesartan (BENICAR) 20 mg tablet; Take 1 tablet (20 mg total) by mouth daily    6. Mild depression  Assessment & Plan:  Resolved       7. Mixed hyperlipidemia  Assessment & Plan:  Patient is over due for labs. Not sure if he has been taking cholesterol medications. He is supposed to be taking atorvastatin 20mg this was reordered today     Orders:  -     Ambulatory Referral to Cardiology; Future  -     atorvastatin (LIPITOR) 20 mg tablet; Take 1 tablet (20 mg total) by mouth daily    8. Insomnia, unspecified type  Assessment & Plan:  I have ordered hydroxyzine as needed at bedtime. Has been on melatonin and trazodone before with no symptoms relief     Orders:  -     hydrOXYzine pamoate (VISTARIL) 25 mg capsule; Take 1 capsule (25 mg total) by mouth daily at bedtime as needed (sleep)    9. Anxiety  Assessment & Plan:  Resolved              Subjective      Patient presents today for follow up  He also wants medication for sleep- the last visit he was prescribed melatonin which didn't work    Patient also reports that he had hearing test and failed it three time. 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     Patient also reports that he having issues with memory- he finds himself looking for items he misplaced or forgetting what he was looking for.   He states that his bilateral shoulder pain has improved slightly he only takes diclofenac once a day as needed     Has been out of his cholesterol and blood pressure medication as well for quite some time       Review of Systems   HENT:  Positive for hearing loss.    Respiratory:  Negative for chest tightness and shortness of breath.    Cardiovascular:  Positive for chest pain. Negative for palpitations.   Gastrointestinal:  Negative for constipation.   Genitourinary:  Negative for difficulty urinating.   Musculoskeletal:  Positive for arthralgias.   Neurological:  Positive for headaches (sometimes).  "Negative for dizziness and light-headedness.   Psychiatric/Behavioral:  Positive for confusion and sleep disturbance. Negative for dysphoric mood. The patient is not nervous/anxious.        Current Outpatient Medications on File Prior to Visit   Medication Sig   • docusate sodium (COLACE) 100 mg capsule Take 1 capsule (100 mg total) by mouth in the morning   • hydrocortisone (ANUSOL-HC) 2.5 % rectal cream Apply topically 2 (two) times a day   • psyllium (METAMUCIL) 58.6 % packet Take 1 packet by mouth daily (Patient not taking: Reported on 2/2/2024)       Objective     /84 (BP Location: Left arm, Patient Position: Sitting, Cuff Size: Standard)   Pulse 74   Ht 5' 5\" (1.651 m)   Wt 83 kg (183 lb)   SpO2 96%   BMI 30.45 kg/m²     Physical Exam  Vitals and nursing note reviewed.   Constitutional:       Appearance: Normal appearance. He is well-developed. He is obese. He is not ill-appearing.   HENT:      Head: Normocephalic and atraumatic.   Eyes:      Extraocular Movements: Extraocular movements intact.      Conjunctiva/sclera: Conjunctivae normal.      Pupils: Pupils are equal.   Cardiovascular:      Rate and Rhythm: Normal rate and regular rhythm.      Heart sounds: S1 normal and S2 normal. No murmur heard.  Pulmonary:      Effort: Pulmonary effort is normal. No respiratory distress.      Breath sounds: Normal breath sounds.   Neurological:      Mental Status: He is alert and oriented to person, place, and time.   Psychiatric:         Mood and Affect: Mood normal.         Behavior: Behavior normal.         Thought Content: Thought content normal.         Judgment: Judgment normal.         KORINA Palomino    "

## 2024-02-06 NOTE — ASSESSMENT & PLAN NOTE
Patient reports he failed hearing screen and needs to have follow up care per his job or else he can lose his job. There are no testing records available today   Refer to audiology

## 2024-02-06 NOTE — ASSESSMENT & PLAN NOTE
Patient reports he having increasing issues with memory and forgetfulness in his daily activities. I have ordered some labs but will bring him back for memory testing in [place]

## 2024-02-06 NOTE — ASSESSMENT & PLAN NOTE
Patient is over due for labs. Not sure if he has been taking cholesterol medications. He is supposed to be taking atorvastatin 20mg this was reordered today

## 2024-02-06 NOTE — ASSESSMENT & PLAN NOTE
Patient continues to have chest pain. His blood pressure is elevated today as well patient was ordered to have stress test and see cardiology and he did neither. Refer back to cardiology.

## 2024-02-06 NOTE — ASSESSMENT & PLAN NOTE
Patient blood pressure is elevated but he has been without his medications. I have reordered his amlodipine 5  and olmesartan 20mg   I will see him back in 6 weeks.

## 2024-02-06 NOTE — ASSESSMENT & PLAN NOTE
I have ordered hydroxyzine as needed at bedtime. Has been on melatonin and trazodone before with no symptoms relief

## 2024-02-21 PROBLEM — R94.120 FAILED HEARING SCREENING: Status: RESOLVED | Noted: 2024-02-02 | Resolved: 2024-02-21

## 2024-05-09 DIAGNOSIS — G47.00 INSOMNIA, UNSPECIFIED TYPE: ICD-10-CM

## 2024-05-09 DIAGNOSIS — E78.2 MIXED HYPERLIPIDEMIA: ICD-10-CM

## 2024-05-09 DIAGNOSIS — I10 PRIMARY HYPERTENSION: ICD-10-CM

## 2024-05-09 RX ORDER — AMLODIPINE BESYLATE 5 MG/1
5 TABLET ORAL DAILY
Qty: 90 TABLET | Refills: 1 | Status: SHIPPED | OUTPATIENT
Start: 2024-05-09

## 2024-05-09 RX ORDER — HYDROXYZINE PAMOATE 25 MG/1
25 CAPSULE ORAL
Qty: 90 CAPSULE | Refills: 1 | Status: SHIPPED | OUTPATIENT
Start: 2024-05-09

## 2024-05-09 RX ORDER — ATORVASTATIN CALCIUM 20 MG/1
TABLET, FILM COATED ORAL
Qty: 90 TABLET | Refills: 1 | Status: SHIPPED | OUTPATIENT
Start: 2024-05-09

## 2024-07-12 DIAGNOSIS — E78.2 MIXED HYPERLIPIDEMIA: ICD-10-CM

## 2024-07-12 RX ORDER — ATORVASTATIN CALCIUM 20 MG/1
20 TABLET, FILM COATED ORAL DAILY
Qty: 30 TABLET | Refills: 0 | Status: SHIPPED | OUTPATIENT
Start: 2024-07-12

## 2024-07-12 NOTE — TELEPHONE ENCOUNTER
atorvastatin (LIPITOR) 20 mg tablet TOME JIAN TABLETA TODOS LOS SANDY       To [facility]  Has no more

## 2024-08-05 NOTE — LETTER
February 2, 2023     Patient: Carlota Merlin  YOB: 1960  Date of Visit: 2/2/2023      To Whom it May Concern:    Carlota Merlin is under my professional care  Patient needs to have lifting restrictions bilaterally upper extremities nothing greater than 20 pounds  Keep restrictions from 2/2/2023-2/16/2023  If you have any questions or concerns, please don't hesitate to call           Sincerely,          KORINA Duncan        CC: No Recipients no

## 2024-08-17 DIAGNOSIS — M25.512 CHRONIC PAIN OF BOTH SHOULDERS: ICD-10-CM

## 2024-08-17 DIAGNOSIS — M25.511 CHRONIC PAIN OF BOTH SHOULDERS: ICD-10-CM

## 2024-08-17 DIAGNOSIS — G89.29 CHRONIC PAIN OF BOTH SHOULDERS: ICD-10-CM

## 2024-08-18 RX ORDER — DICLOFENAC SODIUM 75 MG/1
TABLET, DELAYED RELEASE ORAL
Qty: 60 TABLET | Refills: 5 | Status: SHIPPED | OUTPATIENT
Start: 2024-08-18

## 2024-08-19 DIAGNOSIS — E78.2 MIXED HYPERLIPIDEMIA: ICD-10-CM

## 2024-08-19 RX ORDER — ATORVASTATIN CALCIUM 20 MG/1
20 TABLET, FILM COATED ORAL DAILY
Qty: 30 TABLET | Refills: 5 | Status: SHIPPED | OUTPATIENT
Start: 2024-08-19

## 2024-10-08 ENCOUNTER — OFFICE VISIT (OUTPATIENT)
Dept: FAMILY MEDICINE CLINIC | Facility: CLINIC | Age: 64
End: 2024-10-08
Payer: COMMERCIAL

## 2024-10-08 VITALS
HEART RATE: 70 BPM | WEIGHT: 179 LBS | HEIGHT: 65 IN | SYSTOLIC BLOOD PRESSURE: 142 MMHG | DIASTOLIC BLOOD PRESSURE: 80 MMHG | OXYGEN SATURATION: 94 % | BODY MASS INDEX: 29.82 KG/M2

## 2024-10-08 DIAGNOSIS — G47.00 INSOMNIA, UNSPECIFIED TYPE: ICD-10-CM

## 2024-10-08 DIAGNOSIS — Z12.5 SCREENING FOR PROSTATE CANCER: ICD-10-CM

## 2024-10-08 DIAGNOSIS — F32.A MILD DEPRESSION: ICD-10-CM

## 2024-10-08 DIAGNOSIS — M25.511 CHRONIC PAIN OF BOTH SHOULDERS: Primary | ICD-10-CM

## 2024-10-08 DIAGNOSIS — Z01.110 ENCOUNTER FOR HEARING SCREENING AFTER FAILED HEARING TEST: ICD-10-CM

## 2024-10-08 DIAGNOSIS — E78.2 MIXED HYPERLIPIDEMIA: ICD-10-CM

## 2024-10-08 DIAGNOSIS — G89.29 CHRONIC PAIN OF BOTH SHOULDERS: Primary | ICD-10-CM

## 2024-10-08 DIAGNOSIS — I10 PRIMARY HYPERTENSION: ICD-10-CM

## 2024-10-08 DIAGNOSIS — M25.512 CHRONIC PAIN OF BOTH SHOULDERS: Primary | ICD-10-CM

## 2024-10-08 PROCEDURE — 99214 OFFICE O/P EST MOD 30 MIN: CPT | Performed by: NURSE PRACTITIONER

## 2024-10-08 RX ORDER — AMLODIPINE BESYLATE 10 MG/1
10 TABLET ORAL DAILY
Qty: 90 TABLET | Refills: 1 | Status: SHIPPED | OUTPATIENT
Start: 2024-10-08

## 2024-10-08 RX ORDER — MIRTAZAPINE 7.5 MG/1
7.5 TABLET, FILM COATED ORAL
Qty: 90 TABLET | Refills: 0 | Status: SHIPPED | OUTPATIENT
Start: 2024-10-08

## 2024-10-08 NOTE — ASSESSMENT & PLAN NOTE
Patient blood pressure still not well controlled  I will increase his amlodipine to 10mg and he can continue with olmesartan 20mg  Recheck in 6 weeks      Orders:    amLODIPine (NORVASC) 10 mg tablet; Take 1 tablet (10 mg total) by mouth daily

## 2024-10-08 NOTE — ASSESSMENT & PLAN NOTE
He had xray that was normal recommend PT. Had one consultation but didn't follow through.     Will refer to orthopedics     Orders:    Ambulatory referral to Orthopedic Surgery; Future

## 2024-10-08 NOTE — ASSESSMENT & PLAN NOTE
Patient never completed labs that was due in February this year.   He was advised to have them done.   He is taking atorvastatin 20mg   Will need to evaluate this

## 2024-10-08 NOTE — ASSESSMENT & PLAN NOTE
Patient had failed hearing test at work and he is worried about this because the job is bothering him   He doesn't feel he has hearing issue   Will refer to ENT     Orders:    Ambulatory Referral to Otolaryngology; Future

## 2024-10-08 NOTE — PROGRESS NOTES
Ambulatory Visit  Name: Zachariah Lacey      : 1960      MRN: [id number]  Encounter Provider: KORINA Palomino  Encounter Date: 10/8/2024   Encounter department: [facility]    Assessment & Plan  Chronic pain of both shoulders  He had xray that was normal recommend PT. Had one consultation but didn't follow through.     Will refer to orthopedics     Orders:    Ambulatory referral to Orthopedic Surgery; Future    Mild depression  Denies any current symptoms          Encounter for hearing screening after failed hearing test  Patient had failed hearing test at work and he is worried about this because the job is bothering him   He doesn't feel he has hearing issue   Will refer to ENT     Orders:    Ambulatory Referral to Otolaryngology; Future    Primary hypertension  Patient blood pressure still not well controlled  I will increase his amlodipine to 10mg and he can continue with olmesartan 20mg  Recheck in 6 weeks      Orders:    amLODIPine (NORVASC) 10 mg tablet; Take 1 tablet (10 mg total) by mouth daily    Insomnia, unspecified type  Patient is not getting relief with hydroxyzine.  Has already tried melatonin and trazodone without symptom relief.   Will have patient try Remeron at bedtime   Can take up to 2 weeks to help his symptoms.     Orders:    mirtazapine (REMERON) 7.5 MG tablet; Take 1 tablet (7.5 mg total) by mouth daily at bedtime    Mixed hyperlipidemia  Patient never completed labs that was due in February this year.   He was advised to have them done.   He is taking atorvastatin 20mg   Will need to evaluate this          Screening for prostate cancer    Orders:    PSA, Total Screen; Future       History of Present Illness     Patient states that last year he was sent for hearing test and he failed it but doesn't understand why because he doesn't have a problem with hearing   He is here for follow up   His bilateral shoulder still hurting his which is rsult of his job. He  "states in order to sleep he uses topical cream which does help.   He had xray that was normal recommend PT. Had one consultation but didn't follow through. Taking diclofenac was helping but now not anymore     He states that his sleeping medication never helped him so he returned it to the pharmacy     He denies any depression           Review of Systems   HENT:  Positive for hearing loss (failed hearing test but denies hearing loss).    Musculoskeletal:  Positive for arthralgias (both shoulders).   Psychiatric/Behavioral:  Positive for sleep disturbance. Negative for dysphoric mood. The patient is not nervous/anxious.            Objective     /80 (BP Location: Right arm, Patient Position: Sitting, Cuff Size: Standard)   Pulse 70   Ht 5' 5\" (1.651 m)   Wt 81.2 kg (179 lb)   SpO2 94%   BMI 29.79 kg/m²     Physical Exam  Vitals and nursing note reviewed.   Constitutional:       Appearance: Normal appearance. He is well-developed. He is not ill-appearing.   HENT:      Head: Normocephalic and atraumatic.      Right Ear: Tympanic membrane normal.      Left Ear: Tympanic membrane normal.      Nose: Nose normal.      Mouth/Throat:      Lips: Pink.      Mouth: Mucous membranes are moist.      Pharynx: Oropharynx is clear.   Eyes:      Extraocular Movements: Extraocular movements intact.      Conjunctiva/sclera: Conjunctivae normal.      Pupils: Pupils are equal.   Cardiovascular:      Rate and Rhythm: Normal rate and regular rhythm.      Heart sounds: S1 normal and S2 normal. No murmur heard.  Pulmonary:      Effort: Pulmonary effort is normal. No respiratory distress.      Breath sounds: Normal breath sounds.   Musculoskeletal:      Right shoulder: Tenderness present. No swelling, bony tenderness or crepitus. Normal range of motion.      Left shoulder: Tenderness present. No swelling, bony tenderness or crepitus. Normal range of motion.        Arms:       Right lower leg: No edema.      Left lower leg: No edema. "   Neurological:      Mental Status: He is alert and oriented to person, place, and time.   Psychiatric:         Mood and Affect: Mood normal.         Thought Content: Thought content normal.

## 2024-10-08 NOTE — ASSESSMENT & PLAN NOTE
Patient is not getting relief with hydroxyzine.  Has already tried melatonin and trazodone without symptom relief.   Will have patient try Remeron at bedtime   Can take up to 2 weeks to help his symptoms.     Orders:    mirtazapine (REMERON) 7.5 MG tablet; Take 1 tablet (7.5 mg total) by mouth daily at bedtime

## 2024-11-07 PROBLEM — Z01.110 ENCOUNTER FOR HEARING SCREENING AFTER FAILED HEARING TEST: Status: RESOLVED | Noted: 2024-10-08 | Resolved: 2024-11-07

## 2025-01-06 ENCOUNTER — OFFICE VISIT (OUTPATIENT)
Dept: FAMILY MEDICINE CLINIC | Facility: CLINIC | Age: 65
End: 2025-01-06
Payer: COMMERCIAL

## 2025-01-06 VITALS
BODY MASS INDEX: 36.02 KG/M2 | TEMPERATURE: 97.8 F | HEART RATE: 82 BPM | RESPIRATION RATE: 16 BRPM | DIASTOLIC BLOOD PRESSURE: 86 MMHG | SYSTOLIC BLOOD PRESSURE: 144 MMHG | WEIGHT: 216.2 LBS | HEIGHT: 65 IN | OXYGEN SATURATION: 96 %

## 2025-01-06 DIAGNOSIS — Z12.11 SCREEN FOR COLON CANCER: ICD-10-CM

## 2025-01-06 DIAGNOSIS — E78.2 MIXED HYPERLIPIDEMIA: ICD-10-CM

## 2025-01-06 DIAGNOSIS — G89.29 CHRONIC PAIN OF BOTH SHOULDERS: ICD-10-CM

## 2025-01-06 DIAGNOSIS — I10 PRIMARY HYPERTENSION: Primary | ICD-10-CM

## 2025-01-06 DIAGNOSIS — M25.512 CHRONIC PAIN OF BOTH SHOULDERS: ICD-10-CM

## 2025-01-06 DIAGNOSIS — G47.00 INSOMNIA, UNSPECIFIED TYPE: ICD-10-CM

## 2025-01-06 DIAGNOSIS — M25.511 CHRONIC PAIN OF BOTH SHOULDERS: ICD-10-CM

## 2025-01-06 PROBLEM — Z60.3 LANGUAGE BARRIER AFFECTING HEALTH CARE: Status: RESOLVED | Noted: 2023-08-10 | Resolved: 2025-01-06

## 2025-01-06 PROBLEM — Z75.8 LANGUAGE BARRIER AFFECTING HEALTH CARE: Status: RESOLVED | Noted: 2023-08-10 | Resolved: 2025-01-06

## 2025-01-06 PROCEDURE — 99214 OFFICE O/P EST MOD 30 MIN: CPT | Performed by: NURSE PRACTITIONER

## 2025-01-06 RX ORDER — MIRTAZAPINE 7.5 MG/1
7.5 TABLET, FILM COATED ORAL
Qty: 90 TABLET | Refills: 0 | Status: SHIPPED | OUTPATIENT
Start: 2025-01-06

## 2025-01-06 RX ORDER — OLMESARTAN MEDOXOMIL 40 MG/1
40 TABLET ORAL DAILY
Qty: 90 TABLET | Refills: 0 | Status: SHIPPED | OUTPATIENT
Start: 2025-01-06

## 2025-01-06 RX ORDER — METHOCARBAMOL 500 MG/1
500 TABLET, FILM COATED ORAL
Qty: 90 TABLET | Refills: 0 | Status: SHIPPED | OUTPATIENT
Start: 2025-01-06

## 2025-01-06 NOTE — PROGRESS NOTES
Name: Zachariah Lacey      : 1960      MRN: [id number]  Encounter Provider: KORINA Palomino  Encounter Date: 2025   Encounter department: [facility]  :  Assessment & Plan  Primary hypertension  Patient blood pressure is still poorly controlled  Increase olmesartan from 20 to 40mg   Continue on amlodipine  Recheck in 8 weeks   Orders:  •  olmesartan (BENICAR) 40 mg tablet; Take 1 tablet (40 mg total) by mouth daily  •  Comprehensive metabolic panel    Insomnia, unspecified type    Orders:  •  mirtazapine (REMERON) 7.5 MG tablet; Take 1 tablet (7.5 mg total) by mouth daily at bedtime    Chronic pain of both shoulders  Recommend patient see ortho- he afraid of injection- tried to encourage patient to do this  He also felt that PT didn't help   Continue on voltaren TID reduced dose from 75mg to 50mg so he can take three times per day   Orders:  •  Ambulatory Referral to Orthopedic Surgery; Future  •  diclofenac (VOLTAREN) 50 mg EC tablet; Take 1 tablet (50 mg total) by mouth 3 (three) times a day  •  methocarbamol (ROBAXIN) 500 mg tablet; Take 1 tablet (500 mg total) by mouth daily at bedtime as needed for muscle spasms    Mixed hyperlipidemia  Patient on statin  Has not complete his labs   New orders placed   Orders:  •  Lipid Panel with Direct LDL reflex; Future  •  TSH, 3rd generation with Free T4 reflex; Future  •  Comprehensive metabolic panel    Screen for colon cancer  Cologuard order placed   Orders:  •  Cologuard           History of Present Illness     Patient presents today for shoulder pain   He states over the last month his pain worsened now has pain into his elbow as well  He has pain in both arms and shoulders   He states he can't even carry a case of water     Patient also states that his mirtazapine was about $100 and he can't afford this     Patient states that because his shoulder pain doesn't sleep well  Because he doesn't sleep well he misses work  "          Review of Systems   Respiratory:  Negative for chest tightness and shortness of breath.    Cardiovascular:  Negative for chest pain, palpitations and leg swelling.   Musculoskeletal:  Positive for arthralgias.        Shoulder pain   Neurological:  Negative for dizziness, light-headedness and headaches.   Psychiatric/Behavioral:  Positive for sleep disturbance.        Objective   /86 (BP Location: Left arm, Patient Position: Sitting, Cuff Size: Adult)   Pulse 82   Temp 97.8 °F (36.6 °C) (Temporal)   Resp 16   Ht 5' 5\" (1.651 m)   Wt 98.1 kg (216 lb 3.2 oz)   SpO2 96%   BMI 35.98 kg/m²      Physical Exam  Vitals and nursing note reviewed.   Constitutional:       Appearance: Normal appearance. He is well-developed. He is not ill-appearing.   HENT:      Head: Normocephalic and atraumatic.   Eyes:      Extraocular Movements: Extraocular movements intact.      Conjunctiva/sclera: Conjunctivae normal.      Pupils: Pupils are equal.   Cardiovascular:      Rate and Rhythm: Normal rate and regular rhythm.      Heart sounds: S1 normal and S2 normal. No murmur heard.  Pulmonary:      Effort: Pulmonary effort is normal. No respiratory distress.      Breath sounds: Normal breath sounds.   Musculoskeletal:      Right shoulder: Tenderness present. No swelling. Decreased range of motion.      Left shoulder: Tenderness present. No swelling. Decreased range of motion.   Neurological:      Mental Status: He is alert and oriented to person, place, and time.   Psychiatric:         Mood and Affect: Mood normal.         Thought Content: Thought content normal.         "

## 2025-01-06 NOTE — ASSESSMENT & PLAN NOTE
Recommend patient see ortho- he afraid of injection- tried to encourage patient to do this  He also felt that PT didn't help   Continue on voltaren TID reduced dose from 75mg to 50mg so he can take three times per day   Orders:  •  Ambulatory Referral to Orthopedic Surgery; Future  •  diclofenac (VOLTAREN) 50 mg EC tablet; Take 1 tablet (50 mg total) by mouth 3 (three) times a day  •  methocarbamol (ROBAXIN) 500 mg tablet; Take 1 tablet (500 mg total) by mouth daily at bedtime as needed for muscle spasms

## 2025-01-06 NOTE — ASSESSMENT & PLAN NOTE
Patient on statin  Has not complete his labs   New orders placed   Orders:  •  Lipid Panel with Direct LDL reflex; Future  •  TSH, 3rd generation with Free T4 reflex; Future  •  Comprehensive metabolic panel

## 2025-01-06 NOTE — LETTER
January 6, 2025     Patient: Zachariah Lacey  YOB: 1960  Date of Visit: 1/6/2025      To Whom it May Concern:    Zachariah Lacey is under my professional care. Zachariah was seen in my office on 1/6/2025. Zachariah may return to work on 1/7/2025 .    If you have any questions or concerns, please don't hesitate to call.         Sincerely,          KORINA Palomino        CC: No Recipients

## 2025-01-06 NOTE — ASSESSMENT & PLAN NOTE
Patient blood pressure is still poorly controlled  Increase olmesartan from 20 to 40mg   Continue on amlodipine  Recheck in 8 weeks   Orders:  •  olmesartan (BENICAR) 40 mg tablet; Take 1 tablet (40 mg total) by mouth daily  •  Comprehensive metabolic panel

## 2025-01-09 ENCOUNTER — TELEPHONE (OUTPATIENT)
Dept: FAMILY MEDICINE CLINIC | Facility: CLINIC | Age: 65
End: 2025-01-09

## 2025-01-09 NOTE — TELEPHONE ENCOUNTER
Patient is requesting a call back from jessica and does not want to discuss with me at this time. Please advise

## 2025-01-13 NOTE — TELEPHONE ENCOUNTER
Pt suppose to be on intermittent FMLA no reason he can't go back to work. Also forms not due until 1/26 per paperwork. I am on vacation will complete when I am back

## 2025-01-13 NOTE — TELEPHONE ENCOUNTER
Patient requesting an up date on FMLA.  He stated his employer stated he can not return to work with out his FMLA forms being completed.    Patient stated he has an Ortho appointment 1/14/25

## 2025-01-14 ENCOUNTER — OFFICE VISIT (OUTPATIENT)
Dept: OBGYN CLINIC | Facility: MEDICAL CENTER | Age: 65
End: 2025-01-14
Payer: COMMERCIAL

## 2025-01-14 VITALS — WEIGHT: 285 LBS | HEIGHT: 65 IN | BODY MASS INDEX: 47.48 KG/M2

## 2025-01-14 DIAGNOSIS — M25.511 CHRONIC PAIN OF BOTH SHOULDERS: Primary | ICD-10-CM

## 2025-01-14 DIAGNOSIS — G89.29 CHRONIC PAIN OF BOTH SHOULDERS: Primary | ICD-10-CM

## 2025-01-14 DIAGNOSIS — M25.512 CHRONIC PAIN OF BOTH SHOULDERS: Primary | ICD-10-CM

## 2025-01-14 PROCEDURE — 99203 OFFICE O/P NEW LOW 30 MIN: CPT | Performed by: ORTHOPAEDIC SURGERY

## 2025-01-14 NOTE — PROGRESS NOTES
Ortho Sports Medicine Shoulder New Patient Visit     Assesment:   64 y.o. male bilateral shoulder pain, suspect small rotator cuff tear    Plan:    Conservative treatment:    Discussed at length with the patient that his bilateral shoulder pain could be caused to impingement versus small rotator cuff tear.  Instructed that I recommend that we start off with conservative treatment options of physical therapy and oral NSAIDs for pain relief.  We did offer corticosteroid injection to the bilateral shoulder but patient would like to hold off at this time.  Instructed that I would like him to attend physical therapy 2-3 times a week for the next 6 weeks to focus on range of motion and strengthening of the shoulder.  Patient does not have improvement of his symptoms or worsening of pain then we will order an MRI of the shoulders or specifically the left because that is worse to rule out rotator cuff tear.  Ice to shoulder 1-2 times daily, for 20 minutes at a time.  PT for ROM and strengthening to shoulder, rotator cuff, scapular stabilizers.  Work note placed the patient is to remain out of work until he is seen back in the office in 6 weeks.  Patient is having difficulty with lifting overhead as well as performing range of motion to perform his job of .  Worker's comp : [phone] Ext [phone]  Fax [phone]      Imaging:    All imaging from today was reviewed by myself and explained to the patient.       Injection:    No Injection planned at this time.      Surgery:     No surgery is recommended at this point, continue with conservative treatment plan as noted.      Follow up:    No follow-ups on file.        Chief Complaint   Patient presents with    Right Shoulder - Pain     More pain on right side.    Left Shoulder - Pain       History of Present Illness:    The patient is a 64 y.o., right hand dominant male whose occupation is , referred to me by their primary care physician,  seen in clinic for consultation of bilateral  shoulder pain, left worse than right.      The patient denies a history of diabetes.  The patient denies a history of thyroid disorder.      Pain is located anterior, lateral.  The patient rates the pain as a 10/10.  The pain has been present for a few months.      The patient denies specific injury but states he has been experiencing bilateral shoulder pain left worse than right for a few weeks now.  Patient reports that he is a  and he is having difficulties with his job.  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  Patient reports that he is having pain in the anterior aspect of the shoulder and does have pain that radiates into the bicep region.  Patient also notes that he is having pain at night that will wake him up from sleep or make it difficult to fall asleep in general.  Patient reports that when he was experiencing this pain a few months ago that his family doctor provided him with a prescription for Diclofenac to be taken 3 times a day which previously was providing him with relief but no longer is. the pain is characterized as sharp, stabbing, dull, achy.  The pain is present daily.  Patient also reports he is having difficulty lifting the gas tank up onto the forklift as well due to pain and weakness.  Patient denies any previous injuries to his knowledge.  Patient denies any prior surgical interventions.  Patient denies any treatment options such as physical therapy or corticosteroid injections for this pain.    Pain is improved by rest.  Pain is aggravated by overhead activity, rotation, and lifting .    Symptoms include clicking and catching.    The patient has weakness.  The patient denies numbness and tingling.     The patient has tried rest, ice, and NSAIDS.          Pain at night,  pain with exercises, pain anterior and radiates into the biceps bilateral, difficulty lifting over head and lifting the tank    Shoulder Surgical History:  None    Past Medical, Social and Family History:  Past Medical History:   Diagnosis Date    Anxiety     last assessed 03/29/2017    Chest pain 3/26/2021    COVID-19 11/16/2020 11/12/2020- symptom onset with sore throat, fever, congestion, headache.    Diverticulitis of colon     last assessed 08/14/2012    Hematuria 11/13/2014    Description: 6 mm right-sided kidney stone    Heme positive stool 11/10/2014    Description: Referred to Dr. Rudy Laughlin 11/10/14    Hiatal hernia     last assessed 08/14/2012    IBS (irritable bowel syndrome)     last assessed 08/14/2012    Multiple joint pain 4/19/2018    Right shoulder pain 12/8/2014    Tinea corporis 2/6/2020     Past Surgical History:   Procedure Laterality Date    APPENDECTOMY      GALLBLADDER SURGERY       Allergies   Allergen Reactions    Elavil [Amitriptyline] Other (See Comments)     Burning of skin sensation     Current Outpatient Medications on File Prior to Visit   Medication Sig Dispense Refill    amLODIPine (NORVASC) 10 mg tablet Take 1 tablet (10 mg total) by mouth daily 90 tablet 1    atorvastatin (LIPITOR) 20 mg tablet Take 1 tablet (20 mg total) by mouth daily 30 tablet 5    diclofenac (VOLTAREN) 50 mg EC tablet Take 1 tablet (50 mg total) by mouth 3 (three) times a day 90 tablet 3    docusate sodium (COLACE) 100 mg capsule Take 1 capsule (100 mg total) by mouth in the morning 90 capsule 3    hydrocortisone (ANUSOL-HC) 2.5 % rectal cream Apply topically 2 (two) times a day 28 g 1    methocarbamol (ROBAXIN) 500 mg tablet Take 1 tablet (500 mg total) by mouth daily at bedtime as needed for muscle spasms 90 tablet 0    mirtazapine (REMERON) 7.5 MG tablet Take 1 tablet (7.5 mg total) by mouth daily at bedtime 90 tablet 0    olmesartan (BENICAR) 40 mg tablet Take 1 tablet (40 mg total) by mouth daily 90  "tablet 0     No current facility-administered medications on file prior to visit.     Social History     Socioeconomic History    Marital status: Single     Spouse name: Not on file    Number of children: Not on file    Years of education: Not on file    Highest education level: Not on file   Occupational History    Not on file   Tobacco Use    Smoking status: Never    Smokeless tobacco: Never   Vaping Use    Vaping status: Never Used   Substance and Sexual Activity    Alcohol use: No    Drug use: No    Sexual activity: Not on file   Other Topics Concern    Not on file   Social History Narrative    Not on file     Social Drivers of Health     Financial Resource Strain: Not on file   Food Insecurity: Not on file   Transportation Needs: Not on file   Physical Activity: Not on file   Stress: Not on file   Social Connections: Not on file   Intimate Partner Violence: Not on file   Housing Stability: Not on file         I have reviewed the past medical, surgical, social and family history, medications and allergies as documented in the EMR.    Review of systems: ROS is negative other than that noted in the HPI.  Constitutional: Negative for fatigue and fever.   HENT: Negative for sore throat.    Respiratory: Negative for shortness of breath.    Cardiovascular: Negative for chest pain.   Gastrointestinal: Negative for abdominal pain.   Endocrine: Negative for cold intolerance and heat intolerance.   Genitourinary: Negative for flank pain.   Musculoskeletal: Negative for back pain.   Skin: Negative for rash.   Allergic/Immunologic: Negative for immunocompromised state.   Neurological: Negative for dizziness.   Psychiatric/Behavioral: Negative for agitation.      Physical Exam:    Height 5' 5\" (1.651 m), weight 129 kg (285 lb).    General/Constitutional: NAD, well developed, well nourished  HENT: Normocephalic, atraumatic  CV: Intact distal pulses, regular rate  Resp: No respiratory distress or labored breathing  GI: Soft " and non-tender   Lymphatic: No lymphadenopathy palpated  Neuro: Alert and Oriented x 3, no focal deficits  Psych: Normal mood, normal affect, normal judgement, normal behavior  Skin: Warm, dry, no rashes, no erythema      Shoulder focused exam:       RIGHT LEFT    Scapula Atrophy Negative Negative     Winging Negative Negative     Protraction Negative Negative    Rotator cuff SS 5/5 5/5     IS 5/5 5/5     SubS 5/5 5/5    ROM     170     ER0 60 60                   IRb T6    T6    TTP: AC Negative Positive     Biceps Negative Negative     Coracoid Negative Negative    Special Tests: O'Briens Negative Negative     Canales-shear Negative Negative     Cross body Adduction Negative Negative     Speeds  Negative Negative     Natasha's Negative Negative     Whipple Negative 5/5 with pain Negative 5/5 with pain       Neer Negative Negative     Esquivel Negative Negative    Instability: Apprehension & relocation not tested not tested     Load & shift not tested not tested    Other: Crank Negative Negative                 UE NV Exam: +2 Radial pulses bilaterally  Sensation intact to light touch C5-T1 bilaterally, Radial/median/ulnar nerve motor intact      Bilateral elbow, wrist, and and forearm ROM full, painless with passive ROM, no ttp or crepitance throughout extremities below shoulder joint    Cervical ROM is full without pain, numbness or tingling      Shoulder Imaging    X-rays of the left shoulder were reviewed, which demonstrate mild glenohumeral osteoarthritis with joint space narrowing, sclerotic changes, and osteophyte formation seen in the posterior inferior aspect of the glenoid with mild AC joint osteoarthritis.  X-ray of the right shoulder was reviewed, which demonstrates moderate AC joint osteoarthritis.  I have reviewed the radiology report and disagree with their impression.

## 2025-01-14 NOTE — LETTER
January 14, 2025     Patient: Zachariah Lacey  YOB: 1960  Date of Visit: 1/14/2025      To Whom it May Concern:    Zachariah Lacey is under my professional care. Zachariah was seen in my office on 1/14/2025. Zachariah is unable to return to work at this time due to bilateral shoulder pain/ injury. Patient is to attend physical therapy at this time for the next 6 weeks. Patient will be seen back in the office in 6 weeks for re-evaluation. He is to remain out of work until follow-up.    If you have any questions or concerns, please don't hesitate to call.         Sincerely,          Edmar Graf DO        CC: No Recipients

## 2025-01-21 ENCOUNTER — EVALUATION (OUTPATIENT)
Dept: PHYSICAL THERAPY | Facility: MEDICAL CENTER | Age: 65
End: 2025-01-21
Payer: COMMERCIAL

## 2025-01-21 ENCOUNTER — TELEPHONE (OUTPATIENT)
Dept: FAMILY MEDICINE CLINIC | Facility: CLINIC | Age: 65
End: 2025-01-21

## 2025-01-21 DIAGNOSIS — M25.511 CHRONIC PAIN OF BOTH SHOULDERS: Primary | ICD-10-CM

## 2025-01-21 DIAGNOSIS — M25.512 CHRONIC PAIN OF BOTH SHOULDERS: Primary | ICD-10-CM

## 2025-01-21 DIAGNOSIS — G89.29 CHRONIC PAIN OF BOTH SHOULDERS: Primary | ICD-10-CM

## 2025-01-21 PROCEDURE — 97161 PT EVAL LOW COMPLEX 20 MIN: CPT | Performed by: PHYSICAL THERAPIST

## 2025-01-21 NOTE — TELEPHONE ENCOUNTER
Pt came into office because he needed FMLA ppw completed. As I reviewed his chart ortho took him out of work for 6 wks and so I explained he will have to take ppw to ortho to complete.

## 2025-01-21 NOTE — PROGRESS NOTES
PT Evaluation     Today's date: 2025  Patient name: Zachariah Lacey  : 1960  MRN: [id number]  Referring provider: Rohini Bangura,*  Dx:   Encounter Diagnosis     ICD-10-CM    1. Chronic pain of both shoulders  M25.511 Ambulatory Referral to Physical Therapy    G89.29     M25.512                      Assessment  Impairments: abnormal muscle firing, abnormal or restricted ROM, activity intolerance, impaired physical strength, lacks appropriate home exercise program, pain with function, scapular dyskinesis, participation limitations and activity limitations  Functional limitations: reaching, lifting, donning/doffing clothing, donning/doffing boots, sleeping, job duties, lifting grandchildren  Symptom irritability: moderate    Assessment details: Zachariah Lacey is a pleasant 64 y.o. male who presents with chronic bilateral shoulder pain (R worse than L) of a gradual onset after repetitive job duties involving overhead reaching and lifting. No further referral is necessary at this time based upon examination results.    Language line was utilized for interpretation services.    Primary movement impairment is weakness of his bilateral scapular stabilizers, which contributes to compensatory stress on his shoulders when lifting and reaching during ADL and job duties. In addition, limitations in bilateral shoulder ABD AROM further prevent him from performing ADL to his prior capacity. Patient also presents with mild R RTC weakness, which limits his ability to lift his grandchildren. Patient was educated in an illustrated HEP for scapular stability and shoulder mobility, and he was able to complete exercises without pain. Patient would benefit from skilled PT services to address the listed impairments to facilitate a full return to PLOF. Thank you for the referral.    Barriers to therapy: none  Understanding of Dx/Px/POC: good     Prognosis: good  Prognosis details: Positive prognostic factors include positive  attitude towards recovery. Negative prognostic factors include chronicity of symptoms.    Goals  STG:  Patient will be independent with home exercise program.   Patient will be able to perform a proper scapular retraction without compensation to reduce stress on bilateral shoulders when reaching and lifting.  LTG:  Patient will increase bilateral shoulder ABD AROM by at least 20-30 deg to be able to reach overhead.  Patient will increase R shoulder ER strength to 5/5 to be able to lift grandchildren.  Patient will return to job duties without limitation.  Patient will be able to lift and play with grandchildren.  Patient will be able to don/doff clothing.  Patient will be able to manage symptoms independently.     Plan  Patient would benefit from: skilled physical therapy  Referral necessary: No  Planned modality interventions: cryotherapy and thermotherapy: hydrocollator packs    Planned therapy interventions: abdominal trunk stabilization, activity modification, IASTM, joint mobilization, kinesiology taping, manual therapy, massage, Evans taping, motor coordination training, neuromuscular re-education, patient/caregiver education, postural training, strengthening, stretching, therapeutic activities, therapeutic exercise, flexibility, functional ROM exercises, graded activity, graded exercise, body mechanics training and home exercise program    Frequency: 1x/every other week.  Duration in weeks: 6  Plan of Care beginning date: 1/21/2025  Plan of Care expiration date: 2/28/2025  Treatment plan discussed with: patient  Plan details: Prognosis is above given PT services 1x/every other week for 6 weeks and given HEP adherence.      Subjective Evaluation    History of Present Illness  Mechanism of injury: This is a 64 y.o. male presenting with bilateral shoulder pain for the last 2 years of a gradual onset after repetitive overhead lifting and reaching as a . He has pain in both shoulders but more  pain in his R shoulder vs his L. The pain is located in the front of both shoulders and occasionally in the outside of both shoulders. No radiating pain beyond his elbows and no numbness/tingling. He is currently out of work for 2 weeks and has a 20# lifting restriction.            Recurrent probem    Quality of life: good    Patient Goals  Patient goals for therapy: decreased pain, increased strength, return to work and increased motion  Patient goal: to be able to lift grandchildren, to be able to reach, to be able to lift  Pain  Current pain ratin  At best pain ratin  At worst pain rating: 10  Location: bilateral anterior shoulders/lateral upper arm  Alleviating factors: Icy Hot.  Aggravating factors: overhead activity and lifting (pushing pallets, sleeping, donning/doffing jackets, donning/doffing boots)      Diagnostic Tests  X-ray: normal (per chart- - bilateral shoulders)  Treatments  Previous treatment: chiropractic      Objective     Static Posture     Comments  Difficulty performing scapular retraction without compensation    Tenderness     Left Shoulder   Tenderness in the AC joint and biceps tendon (proximal).     Right Shoulder  Tenderness in the biceps tendon (proximal).     Active Range of Motion   Cervical/Thoracic Spine       Cervical    Flexion:  WFL  Extension:  WFL  Left rotation:  WFL  Right rotation:  WFL  Left Shoulder   Flexion: 180 (end-range pain) degrees with pain  Abduction: 165 degrees with pain  External rotation BTH: C6   Internal rotation BTB: T10 with pain    Right Shoulder   Flexion: 180 (end-range pain) degrees with pain  Abduction: 100 degrees with pain  External rotation BTH: C6   Internal rotation BTB: T10 with pain    Additional Active Range of Motion Details  Seated cervical FLX/EXT AROM repeated motion testing unremarkable    Passive Range of Motion   Left Shoulder   Flexion: 180 (end-range pain) degrees with pain  Abduction: 180 (end-range pain) degrees with  "pain  External rotation 0°: 75 degrees   External rotation 90°: 90 degrees with pain  Internal rotation 90°: 75 degrees with pain    Right Shoulder   Flexion: 180 (end-range pain) degrees with pain  Abduction: 180 (end-range pain) degrees with pain  External rotation 0°: 75 degrees   External rotation 90°: 90 degrees with pain  Internal rotation 90°: 75 degrees with pain    Strength/Myotome Testing     Left Shoulder     Planes of Motion   Flexion: 4+   Abduction: 4-   External rotation at 0°: 4   Internal rotation at 0°: 4+     Isolated Muscles   Biceps: 4+   Lower trapezius: 3+   Middle trapezius: 3+   Triceps: 5     Right Shoulder     Planes of Motion   Flexion: 4+   Abduction: 4-   External rotation at 0°: 3+   Internal rotation at 0°: 4+     Isolated Muscles   Biceps: 4+   Lower trapezius: 3+   Middle trapezius: 3+   Triceps: 5     Tests     Left Shoulder   Positive Hawkin's, painful arc, scapular relocation  (for increased strength) and scapular assistance test positive (for decreased pain).   Negative drop arm and external rotation lag sign.     Right Shoulder   Positive Hawkin's and painful arc.   Negative drop arm, external rotation lag sign, scapular relocation and scapular assistance .              Precautions: HTN    HEP: table slides (FLX), scapular retractions  Manuals 1/21                                                                Neuro Re-Ed                                                                                                        Ther Ex             Pulleys NV            Supine serratus punches NV            Wand FLX AAROM NV            Table slides 5\"x10 HEP FLX            Scapular retractions 5\"x10 HEP            Scapular row NV no band            Scapular ext NV no band                         Ther Activity                                       Gait Training                                       Modalities             MHP b/l shoulders NV pre tx                              "

## 2025-02-06 ENCOUNTER — OFFICE VISIT (OUTPATIENT)
Dept: PHYSICAL THERAPY | Facility: MEDICAL CENTER | Age: 65
End: 2025-02-06
Payer: COMMERCIAL

## 2025-02-06 DIAGNOSIS — G89.29 CHRONIC PAIN OF BOTH SHOULDERS: Primary | ICD-10-CM

## 2025-02-06 DIAGNOSIS — M25.511 CHRONIC PAIN OF BOTH SHOULDERS: Primary | ICD-10-CM

## 2025-02-06 DIAGNOSIS — M25.512 CHRONIC PAIN OF BOTH SHOULDERS: Primary | ICD-10-CM

## 2025-02-06 PROCEDURE — 97110 THERAPEUTIC EXERCISES: CPT

## 2025-02-06 NOTE — PROGRESS NOTES
"Daily Note     Today's date: 2025  Patient name: Zachariah Lacey  : 1960  MRN: [id number]  Referring provider: Rohini Bangura,*  Dx:   Encounter Diagnosis     ICD-10-CM    1. Chronic pain of both shoulders  M25.511     G89.29     M25.512                      Subjective: Pt reports that his shoulders feel bad today.       Objective: See treatment diary below      Assessment: POC initiated. No adverse effects noted. Patient able to tolerate all exercises progressions with minimal to no pain. Tolerated treatment well. Patient demonstrated fatigue post treatment, exhibited good technique with therapeutic exercises, and would benefit from continued PT      Plan: Continue per plan of care.      Precautions: HTN    HEP: table slides (FLX), scapular retractions  Manuals                                                                Neuro Re-Ed                                                                                                        Ther Ex             Pulleys NV 3'           Supine serratus punches NV 2x10           Wand FLX AAROM NV 2x10           Table slides 5\"x10 HEP FLX 5\" 2x10           Scapular retractions 5\"x10 HEP 5\" 2x10           Scapular row NV no band 2x10           Scapular ext NV no band 2x10                        Ther Activity                                       Gait Training                                       Modalities             MHP b/l shoulders NV pre tx 10' post                             "

## 2025-02-09 DIAGNOSIS — E78.2 MIXED HYPERLIPIDEMIA: ICD-10-CM

## 2025-02-12 RX ORDER — ATORVASTATIN CALCIUM 20 MG/1
TABLET, FILM COATED ORAL
Qty: 30 TABLET | Refills: 5 | Status: SHIPPED | OUTPATIENT
Start: 2025-02-12

## 2025-02-20 ENCOUNTER — APPOINTMENT (OUTPATIENT)
Dept: LAB | Facility: HOSPITAL | Age: 65
End: 2025-02-20
Payer: COMMERCIAL

## 2025-02-20 ENCOUNTER — OFFICE VISIT (OUTPATIENT)
Dept: PHYSICAL THERAPY | Facility: MEDICAL CENTER | Age: 65
End: 2025-02-20
Payer: COMMERCIAL

## 2025-02-20 ENCOUNTER — OFFICE VISIT (OUTPATIENT)
Dept: FAMILY MEDICINE CLINIC | Facility: CLINIC | Age: 65
End: 2025-02-20
Payer: COMMERCIAL

## 2025-02-20 ENCOUNTER — HOSPITAL ENCOUNTER (OUTPATIENT)
Dept: CT IMAGING | Facility: HOSPITAL | Age: 65
End: 2025-02-20
Payer: COMMERCIAL

## 2025-02-20 VITALS
HEART RATE: 92 BPM | RESPIRATION RATE: 16 BRPM | OXYGEN SATURATION: 98 % | WEIGHT: 189.8 LBS | TEMPERATURE: 98 F | BODY MASS INDEX: 31.62 KG/M2 | SYSTOLIC BLOOD PRESSURE: 142 MMHG | DIASTOLIC BLOOD PRESSURE: 82 MMHG | HEIGHT: 65 IN

## 2025-02-20 DIAGNOSIS — R22.2 MASS OF RIGHT CHEST WALL: Primary | ICD-10-CM

## 2025-02-20 DIAGNOSIS — M25.511 CHRONIC PAIN OF BOTH SHOULDERS: Primary | ICD-10-CM

## 2025-02-20 DIAGNOSIS — M25.512 CHRONIC PAIN OF BOTH SHOULDERS: Primary | ICD-10-CM

## 2025-02-20 DIAGNOSIS — R22.2 MASS OF RIGHT CHEST WALL: ICD-10-CM

## 2025-02-20 DIAGNOSIS — G89.29 CHRONIC PAIN OF BOTH SHOULDERS: Primary | ICD-10-CM

## 2025-02-20 LAB
BUN SERPL-MCNC: 14 MG/DL (ref 5–25)
CREAT SERPL-MCNC: 0.98 MG/DL (ref 0.6–1.3)
GFR SERPL CREATININE-BSD FRML MDRD: 82 ML/MIN/1.73SQ M

## 2025-02-20 PROCEDURE — 84520 ASSAY OF UREA NITROGEN: CPT

## 2025-02-20 PROCEDURE — 99214 OFFICE O/P EST MOD 30 MIN: CPT | Performed by: NURSE PRACTITIONER

## 2025-02-20 PROCEDURE — 82565 ASSAY OF CREATININE: CPT

## 2025-02-20 PROCEDURE — 36415 COLL VENOUS BLD VENIPUNCTURE: CPT

## 2025-02-20 PROCEDURE — 71260 CT THORAX DX C+: CPT

## 2025-02-20 PROCEDURE — 97110 THERAPEUTIC EXERCISES: CPT | Performed by: PHYSICAL THERAPIST

## 2025-02-20 RX ADMIN — IOHEXOL 85 ML: 350 INJECTION, SOLUTION INTRAVENOUS at 15:49

## 2025-02-20 NOTE — PROGRESS NOTES
"Daily Note     Today's date: 2025  Patient name: Zachariah Lacey  : 1960  MRN: [id number]  Referring provider: Rohini Bangura,*  Dx:   Encounter Diagnosis     ICD-10-CM    1. Chronic pain of both shoulders  M25.511     G89.29     M25.512                      Subjective: Patient reports that he is having pain in both of his shoulders today, but he notes that he felt relief after his last PT session. He reports that he will be seeing his PCP today due to some swelling in the R side of his shoulder blade region.      Objective: See treatment diary below      Assessment: R shoulder was visualized, and patient demonstrated palpable soft tissue tension in R UT near insertion on R superior scapular angle. Continued with gentle shoulder mobility and scapular stability program as outlined below. Cueing provided during supine serratus punches to prevent compensation from biceps. Added scap retraction + ER and lawn mowers to improve proximal strength and to reduce stress on shoulder girdle. Also added seated UT stretching to improve flexibility. Patient was educated to add seated UT stretch to HEP. Patient tolerated treatment well and completed program without pain. He reported alleviation of symptoms post-tx. Patient would benefit from continued PT to address impairments to maximize function.      Plan: Continue per plan of care. 1x/every other week. Re-evaluate in 2 weeks.      Precautions: HTN    HEP: table slides (FLX), scapular retractions, seated UT stretch  Manuals                                                               Neuro Re-Ed                                                                                                        Ther Ex             Pulleys NV 3' 5'          Supine serratus punches NV 2x10 2x10          Wand FLX AAROM NV 2x10 5\"x20          Table slides 5\"x10 HEP FLX 5\" 2x10 5\"x20 b/l          Scapular retractions 5\"x10 HEP 5\" 2x10 3\"  2x10          Scapular row " "NV no band 2x10 2x10          Scapular ext NV no band 2x10 2x10          Scapular retraction + ER   2x10          Lawn mowers   2x10 b/l          Seated UT stretch   20\"x3 b/l          Ther Activity                                       Gait Training                                       Modalities             MHP b/l shoulders NV pre tx 10' post 10' pre                              "

## 2025-02-20 NOTE — PROGRESS NOTES
"Name: Zachariah Lacey      : 1960      MRN: [id number]  Encounter Provider: KORINA Palomino  Encounter Date: 2025   Encounter department: [facility]  :  Assessment & Plan  Mass of right chest wall  Patient has growing soft tissue mass of the right scapular area of his posterior chest  Has been growing over the last 2 weeks. Causing motion restriction  I am concern for this being malignant finding therefore have ordered stat CT chest   Orders:  •  CT chest w contrast; Future  •  BUN; Future  •  Creatinine, serum; Future           History of Present Illness   Patient states he started having increased right shoulder pain. His family went to apply muscle rub and noticed a lump 2 weeks ago. Since then it has become more painful and become larger   He states that he having increased issues with motion in his right arm   Also having right neck pain and stiffness       Review of Systems   Constitutional:  Negative for diaphoresis and fever.   Musculoskeletal:  Positive for arthralgias, myalgias, neck pain and neck stiffness.       Objective   /82 (BP Location: Left arm, Patient Position: Sitting, Cuff Size: Adult)   Pulse 92   Temp 98 °F (36.7 °C) (Temporal)   Resp 16   Ht 5' 5\" (1.651 m)   Wt 86.1 kg (189 lb 12.8 oz)   SpO2 98%   BMI 31.58 kg/m²      Physical Exam  Vitals and nursing note reviewed.   Constitutional:       Appearance: Normal appearance. He is well-developed. He is obese. He is not ill-appearing.   HENT:      Head: Normocephalic and atraumatic.   Eyes:      Extraocular Movements: Extraocular movements intact.      Conjunctiva/sclera: Conjunctivae normal.      Pupils: Pupils are equal.   Cardiovascular:      Rate and Rhythm: Normal rate and regular rhythm.      Heart sounds: S1 normal and S2 normal. No murmur heard.  Pulmonary:      Effort: Pulmonary effort is normal. No respiratory distress.      Breath sounds: Normal breath sounds.   Musculoskeletal:        " Arms:    Neurological:      Mental Status: He is alert and oriented to person, place, and time.   Psychiatric:         Mood and Affect: Mood normal.         Thought Content: Thought content normal.

## 2025-02-21 ENCOUNTER — RESULTS FOLLOW-UP (OUTPATIENT)
Dept: FAMILY MEDICINE CLINIC | Facility: CLINIC | Age: 65
End: 2025-02-21

## 2025-02-21 DIAGNOSIS — D17.1 LIPOMA OF TORSO: Primary | ICD-10-CM

## 2025-02-21 NOTE — TELEPHONE ENCOUNTER
Pt came into office today as pt received called and wished to discus in person, I gave pt results of the CT and that he would have to see General Surgery in regards of removing Lipoma.     I offered to schedule pt appt as needed as urgency was ASAP for pt ref, I have schedule pt for a consult with general surgery as needed for 2/24/2025 at 1:30 PM with Gino.     Pt gave verbal understanding to results and will see general surgery.     Thank you,   Any

## 2025-02-24 ENCOUNTER — OFFICE VISIT (OUTPATIENT)
Dept: SURGERY | Facility: CLINIC | Age: 65
End: 2025-02-24
Payer: COMMERCIAL

## 2025-02-24 VITALS
HEIGHT: 65 IN | WEIGHT: 186 LBS | TEMPERATURE: 97.7 F | BODY MASS INDEX: 30.99 KG/M2 | RESPIRATION RATE: 16 BRPM | SYSTOLIC BLOOD PRESSURE: 152 MMHG | DIASTOLIC BLOOD PRESSURE: 90 MMHG | OXYGEN SATURATION: 96 % | HEART RATE: 82 BPM

## 2025-02-24 DIAGNOSIS — D17.1 LIPOMA OF TORSO: ICD-10-CM

## 2025-02-24 PROCEDURE — 99203 OFFICE O/P NEW LOW 30 MIN: CPT | Performed by: STUDENT IN AN ORGANIZED HEALTH CARE EDUCATION/TRAINING PROGRAM

## 2025-02-24 RX ORDER — SODIUM CHLORIDE, SODIUM LACTATE, POTASSIUM CHLORIDE, CALCIUM CHLORIDE 600; 310; 30; 20 MG/100ML; MG/100ML; MG/100ML; MG/100ML
125 INJECTION, SOLUTION INTRAVENOUS CONTINUOUS
OUTPATIENT
Start: 2025-02-24

## 2025-02-24 NOTE — ASSESSMENT & PLAN NOTE
Patient with right upper back soft tissue mass likely lipoma notable on exam - it has been growing in size and causing him discomfort with activity. I reviewed his imaging and it appears to be lipoma. Discussed risks benefits alternatives with him and he would like to proceed with excision of this mass.    Orders:    Ambulatory Referral to General Surgery    Diet NPO; Sips with meds; Standing    Apply Sequential Compression Device; Standing    Place sequential compression device; Standing    Case request operating room: EXCISION  BIOPSY LESION/MASS BACK; Standing

## 2025-02-24 NOTE — PROGRESS NOTES
Name: Zachariah Lacey      : 1960      MRN: [id number]  Encounter Provider: Santi Ling DO  Encounter Date: 2025   Encounter department: [facility]  :  Assessment & Plan  Lipoma of torso  Patient with right upper back soft tissue mass likely lipoma notable on exam - it has been growing in size and causing him discomfort with activity. I reviewed his imaging and it appears to be lipoma. Discussed risks benefits alternatives with him and he would like to proceed with excision of this mass.    Orders:    Ambulatory Referral to General Surgery    Diet NPO; Sips with meds; Standing    Apply Sequential Compression Device; Standing    Place sequential compression device; Standing    Case request operating room: EXCISION  BIOPSY LESION/MASS BACK; Standing        History of Present Illness   Zachariah Lacey is a 64 y.o. male who presents back mass  Patient presents with:  Lipoma: Patient is here today regarding a Lump on the right upper part of his back. Painful to the touch. Unsure how long he has had the lump.           Review of Systems   All other systems reviewed and are negative.   as per HPI.  Medical History Reviewed by provider this encounter:  Tobacco  Allergies  Meds  Problems  Med Hx  Surg Hx  Fam Hx     .  Past Medical History   Past Medical History:   Diagnosis Date    Anxiety     last assessed 2017    Chest pain 3/26/2021    COVID-19 2020- symptom onset with sore throat, fever, congestion, headache.    Diverticulitis of colon     last assessed 2012    Hematuria 2014    Description: 6 mm right-sided kidney stone    Heme positive stool 11/10/2014    Description: Referred to Dr. Rudy Laughlin 11/10/14    Hiatal hernia     last assessed 2012    IBS (irritable bowel syndrome)     last assessed 2012    Multiple joint pain 2018    Right shoulder pain 2014    Tinea corporis 2020     Past Surgical History:    Procedure Laterality Date    APPENDECTOMY      GALLBLADDER SURGERY       Family History   Problem Relation Age of Onset    Alcohol abuse Father       reports that he has never smoked. He has never used smokeless tobacco. He reports that he does not drink alcohol and does not use drugs.  Current Outpatient Medications   Medication Instructions    amLODIPine (NORVASC) 10 mg, Oral, Daily    atorvastatin (LIPITOR) 20 mg tablet TOME 1 TABLETA POR VIA ORAL TODOS LOS SANDY    diclofenac sodium (VOLTAREN) 50 mg, Oral, 3 times daily    docusate sodium (COLACE) 100 mg, Oral, Daily    hydrocortisone (ANUSOL-HC) 2.5 % rectal cream Topical, 2 times daily    methocarbamol (ROBAXIN) 500 mg, Oral, Daily at bedtime PRN    mirtazapine (REMERON) 7.5 mg, Oral, Daily at bedtime    olmesartan (BENICAR) 40 mg, Oral, Daily     Allergies   Allergen Reactions    Elavil [Amitriptyline] Other (See Comments)     Burning of skin sensation      Current Outpatient Medications on File Prior to Visit   Medication Sig Dispense Refill    amLODIPine (NORVASC) 10 mg tablet Take 1 tablet (10 mg total) by mouth daily 90 tablet 1    atorvastatin (LIPITOR) 20 mg tablet TOME 1 TABLETA POR VIA ORAL TODOS LOS SANDY 30 tablet 5    diclofenac (VOLTAREN) 50 mg EC tablet Take 1 tablet (50 mg total) by mouth 3 (three) times a day 90 tablet 3    docusate sodium (COLACE) 100 mg capsule Take 1 capsule (100 mg total) by mouth in the morning 90 capsule 3    methocarbamol (ROBAXIN) 500 mg tablet Take 1 tablet (500 mg total) by mouth daily at bedtime as needed for muscle spasms 90 tablet 0    mirtazapine (REMERON) 7.5 MG tablet Take 1 tablet (7.5 mg total) by mouth daily at bedtime 90 tablet 0    olmesartan (BENICAR) 40 mg tablet Take 1 tablet (40 mg total) by mouth daily 90 tablet 0    hydrocortisone (ANUSOL-HC) 2.5 % rectal cream Apply topically 2 (two) times a day (Patient not taking: Reported on 2/24/2025) 28 g 1     No current facility-administered medications on  "file prior to visit.      Social History     Tobacco Use    Smoking status: Never    Smokeless tobacco: Never   Vaping Use    Vaping status: Never Used   Substance and Sexual Activity    Alcohol use: No    Drug use: No    Sexual activity: Not on file        Objective   /90 (BP Location: Left arm, Patient Position: Sitting, Cuff Size: Large)   Pulse 82   Temp 97.7 °F (36.5 °C) (Tympanic)   Resp 16   Ht 5' 5\" (1.651 m)   Wt 84.4 kg (186 lb)   SpO2 96%   BMI 30.95 kg/m²      Physical Exam  Vitals and nursing note reviewed.   Constitutional:       General: He is not in acute distress.     Appearance: Normal appearance. He is well-developed. He is not ill-appearing.   HENT:      Head: Normocephalic.      Right Ear: External ear normal.      Left Ear: External ear normal.      Mouth/Throat:      Pharynx: Oropharynx is clear.   Eyes:      General:         Right eye: No discharge.         Left eye: No discharge.      Extraocular Movements: Extraocular movements intact.      Conjunctiva/sclera: Conjunctivae normal.   Cardiovascular:      Rate and Rhythm: Normal rate.   Pulmonary:      Effort: Pulmonary effort is normal. No respiratory distress.   Musculoskeletal:         General: Normal range of motion.      Cervical back: Normal range of motion.      Comments: Right upper back mobile tender soft tissue mass   Skin:     General: Skin is warm.   Neurological:      General: No focal deficit present.      Mental Status: He is alert and oriented to person, place, and time.   Psychiatric:         Mood and Affect: Mood normal.           Radiology Results Review: I personally reviewed the following image studies in PACS and associated radiology reports: CT chest. My interpretation of the radiology images/reports is: right upper back lipoma.      "

## 2025-02-25 ENCOUNTER — OFFICE VISIT (OUTPATIENT)
Dept: OBGYN CLINIC | Facility: MEDICAL CENTER | Age: 65
End: 2025-02-25
Payer: COMMERCIAL

## 2025-02-25 VITALS — HEIGHT: 65 IN | BODY MASS INDEX: 30.99 KG/M2 | WEIGHT: 186 LBS

## 2025-02-25 DIAGNOSIS — M25.511 CHRONIC PAIN OF BOTH SHOULDERS: Primary | ICD-10-CM

## 2025-02-25 DIAGNOSIS — G89.29 CHRONIC PAIN OF BOTH SHOULDERS: Primary | ICD-10-CM

## 2025-02-25 DIAGNOSIS — M25.512 CHRONIC PAIN OF BOTH SHOULDERS: Primary | ICD-10-CM

## 2025-02-25 PROCEDURE — 99213 OFFICE O/P EST LOW 20 MIN: CPT | Performed by: ORTHOPAEDIC SURGERY

## 2025-02-25 NOTE — LETTER
February 25, 2025     Patient: Zachariah Lacey  YOB: 1960  Date of Visit: 2/25/2025      To Whom it May Concern:    Zachariah Lacey is under my professional care. Zachariah was seen in my office on 2/25/2025. Zachariah may return to work on 2-26-25 with no restrictions .    If you have any questions or concerns, please don't hesitate to call.         Sincerely,          Edmar Graf,         CC: No Recipients

## 2025-02-25 NOTE — PROGRESS NOTES
Ortho Sports Medicine Shoulder New Patient Visit     Assesment:   64 y.o. male bilateral shoulder pain, suspect small rotator cuff tear    Plan:    Conservative treatment:    PT for ROM and strengthening to shoulder, rotator cuff, scapular stabilizers.          Chief Complaint   Patient presents with    Left Shoulder - Follow-up    Right Shoulder - Follow-up       History of Present Illness:    The patient is a 64 y.o., right hand dominant male whose occupation is , referred to me by their primary care physician, seen in clinic for consultation of bilateral  shoulder pain, left worse than right.      The patient denies a history of diabetes.  The patient denies a history of thyroid disorder.      Pain is located anterior, lateral.  The patient rates the pain as a 10/10.  The pain has been present for a few months.      The patient denies specific injury but states he has been experiencing bilateral shoulder pain left worse than right for a few weeks now.  Patient reports that he is a  and he is having difficulties with his job.  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      He notes no other new injuries at this time.  Mild improvement with PT.  He does not want to consider injections.  He wants to continue conservative treatment.  No other complaints at this time.    Shoulder Surgical History:  None    Past Medical, Social and Family History:  Past Medical History:   Diagnosis Date    Anxiety     last assessed 03/29/2017    Chest pain 3/26/2021    COVID-19 11/16/2020 11/12/2020- symptom onset with sore throat, fever, congestion, headache.    Diverticulitis of colon     last assessed 08/14/2012    Hematuria 11/13/2014    Description: 6 mm right-sided kidney stone    Heme positive stool 11/10/2014    Description: Referred to   Rudy Laughlin 11/10/14    Hiatal hernia     last assessed 08/14/2012    IBS (irritable bowel syndrome)     last assessed 08/14/2012    Multiple joint pain 4/19/2018    Right shoulder pain 12/8/2014    Tinea corporis 2/6/2020     Past Surgical History:   Procedure Laterality Date    APPENDECTOMY      GALLBLADDER SURGERY       Allergies   Allergen Reactions    Elavil [Amitriptyline] Other (See Comments)     Burning of skin sensation     Current Outpatient Medications on File Prior to Visit   Medication Sig Dispense Refill    amLODIPine (NORVASC) 10 mg tablet Take 1 tablet (10 mg total) by mouth daily 90 tablet 1    atorvastatin (LIPITOR) 20 mg tablet TOME 1 TABLETA POR VIA ORAL TODOS LOS SANDY 30 tablet 5    diclofenac (VOLTAREN) 50 mg EC tablet Take 1 tablet (50 mg total) by mouth 3 (three) times a day 90 tablet 3    docusate sodium (COLACE) 100 mg capsule Take 1 capsule (100 mg total) by mouth in the morning 90 capsule 3    methocarbamol (ROBAXIN) 500 mg tablet Take 1 tablet (500 mg total) by mouth daily at bedtime as needed for muscle spasms 90 tablet 0    mirtazapine (REMERON) 7.5 MG tablet Take 1 tablet (7.5 mg total) by mouth daily at bedtime 90 tablet 0    olmesartan (BENICAR) 40 mg tablet Take 1 tablet (40 mg total) by mouth daily 90 tablet 0    hydrocortisone (ANUSOL-HC) 2.5 % rectal cream Apply topically 2 (two) times a day (Patient not taking: Reported on 2/24/2025) 28 g 1     No current facility-administered medications on file prior to visit.     Social History     Socioeconomic History    Marital status: Single     Spouse name: Not on file    Number of children: Not on file    Years of education: Not on file    Highest education level: Not on file   Occupational History    Not on file   Tobacco Use    Smoking status: Never    Smokeless tobacco: Never   Vaping Use    Vaping status: Never Used   Substance and Sexual Activity    Alcohol use: No    Drug use: No    Sexual activity: Not on file   Other Topics  "Concern    Not on file   Social History Narrative    Not on file     Social Drivers of Health     Financial Resource Strain: Not on file   Food Insecurity: Not on file   Transportation Needs: Not on file   Physical Activity: Not on file   Stress: Not on file   Social Connections: Not on file   Intimate Partner Violence: Not on file   Housing Stability: Not on file         I have reviewed the past medical, surgical, social and family history, medications and allergies as documented in the EMR.    Review of systems: ROS is negative other than that noted in the HPI.  Constitutional: Negative for fatigue and fever.   HENT: Negative for sore throat.    Respiratory: Negative for shortness of breath.    Cardiovascular: Negative for chest pain.   Gastrointestinal: Negative for abdominal pain.   Endocrine: Negative for cold intolerance and heat intolerance.   Genitourinary: Negative for flank pain.   Musculoskeletal: Negative for back pain.   Skin: Negative for rash.   Allergic/Immunologic: Negative for immunocompromised state.   Neurological: Negative for dizziness.   Psychiatric/Behavioral: Negative for agitation.      Physical Exam:    Height 5' 5\" (1.651 m), weight 84.4 kg (186 lb).    General/Constitutional: NAD, well developed, well nourished  HENT: Normocephalic, atraumatic  CV: Intact distal pulses, regular rate  Resp: No respiratory distress or labored breathing  GI: Soft and non-tender   Lymphatic: No lymphadenopathy palpated  Neuro: Alert and Oriented x 3, no focal deficits  Psych: Normal mood, normal affect, normal judgement, normal behavior  Skin: Warm, dry, no rashes, no erythema      Shoulder focused exam:       RIGHT LEFT    Scapula Atrophy Negative Negative     Winging Negative Negative     Protraction Negative Negative    Rotator cuff SS 5/5 5/5     IS 5/5 5/5     SubS 5/5 5/5    ROM     170     ER0 60 60                   IRb T6    T6    TTP: AC Negative Positive     Biceps Negative Negative     " Coracoid Negative Negative    Special Tests: O'Briens Negative Negative     Canales-shear Negative Negative     Cross body Adduction Negative Negative     Speeds  Negative Negative     Natasha's Negative Negative     Whipple Negative 5/5 with pain Negative 5/5 with pain       Neer Negative Negative     Esquivel Negative Negative    Instability: Apprehension & relocation not tested not tested     Load & shift not tested not tested    Other: Crank Negative Negative                 UE NV Exam: +2 Radial pulses bilaterally  Sensation intact to light touch C5-T1 bilaterally, Radial/median/ulnar nerve motor intact      Bilateral elbow, wrist, and and forearm ROM full, painless with passive ROM, no ttp or crepitance throughout extremities below shoulder joint    Cervical ROM is full without pain, numbness or tingling      Shoulder Imaging    X-rays of the left shoulder were reviewed, which demonstrate mild glenohumeral osteoarthritis with joint space narrowing, sclerotic changes, and osteophyte formation seen in the posterior inferior aspect of the glenoid with mild AC joint osteoarthritis.  X-ray of the right shoulder was reviewed, which demonstrates moderate AC joint osteoarthritis.  I have reviewed the radiology report and disagree with their impression.

## 2025-03-05 ENCOUNTER — TELEPHONE (OUTPATIENT)
Dept: OBGYN CLINIC | Facility: MEDICAL CENTER | Age: 65
End: 2025-03-05

## 2025-03-05 NOTE — TELEPHONE ENCOUNTER
Caller: Patient- Zachariah    Doctor: Dr. Graf    Reason for call: Patient is calling in with help of Korean interpretor as he stated that he missed a call from someone here at the office. I advised him per the MA's message. He stated that if the call does not come through the first time to please contact him back again as he is having issues with his phone.    Call back#: [phone]

## 2025-03-05 NOTE — TELEPHONE ENCOUNTER
Caller: Patient    Doctor: Dr. Graf    Reason for call: Patient returning call. Patient dropped off while attempting to transfer to the office. If the patient calls back xfr to LB    Call back#: [phone]

## 2025-03-05 NOTE — TELEPHONE ENCOUNTER
Had  speak with Pt stating Pt should follow up with Isaiah Ling DO for his pain. We will be canceling the appointment with Dr. Graf Pt states understanding and will contact the other surgeon.

## 2025-03-05 NOTE — TELEPHONE ENCOUNTER
Left message for Pt to please call the office and ask for me. I will need to speak with Pt about appointment this Friday 3-7-2025.

## 2025-03-07 ENCOUNTER — OFFICE VISIT (OUTPATIENT)
Dept: FAMILY MEDICINE CLINIC | Facility: CLINIC | Age: 65
End: 2025-03-07
Payer: COMMERCIAL

## 2025-03-07 VITALS
WEIGHT: 188 LBS | OXYGEN SATURATION: 98 % | DIASTOLIC BLOOD PRESSURE: 102 MMHG | BODY MASS INDEX: 31.32 KG/M2 | SYSTOLIC BLOOD PRESSURE: 140 MMHG | HEART RATE: 78 BPM | HEIGHT: 65 IN

## 2025-03-07 DIAGNOSIS — M25.511 CHRONIC PAIN OF BOTH SHOULDERS: Primary | ICD-10-CM

## 2025-03-07 DIAGNOSIS — Z00.00 ANNUAL PHYSICAL EXAM: ICD-10-CM

## 2025-03-07 DIAGNOSIS — D17.1 LIPOMA OF TORSO: ICD-10-CM

## 2025-03-07 DIAGNOSIS — M25.512 CHRONIC PAIN OF BOTH SHOULDERS: Primary | ICD-10-CM

## 2025-03-07 DIAGNOSIS — I10 PRIMARY HYPERTENSION: ICD-10-CM

## 2025-03-07 DIAGNOSIS — G89.29 CHRONIC PAIN OF BOTH SHOULDERS: Primary | ICD-10-CM

## 2025-03-07 PROCEDURE — 99214 OFFICE O/P EST MOD 30 MIN: CPT | Performed by: NURSE PRACTITIONER

## 2025-03-07 PROCEDURE — 99396 PREV VISIT EST AGE 40-64: CPT | Performed by: NURSE PRACTITIONER

## 2025-03-07 RX ORDER — VALSARTAN 160 MG/1
160 TABLET ORAL DAILY
Qty: 30 TABLET | Refills: 1 | Status: SHIPPED | OUTPATIENT
Start: 2025-03-07

## 2025-03-07 NOTE — ASSESSMENT & PLAN NOTE
Patient blood pressure is elevated and continues to be poorly controlled  He was on HCTZ discontinued because of urinary complaints   Recommend change olmesartan to valsartan   Recheck in 2 weeks   Orders:  •  valsartan (DIOVAN) 160 mg tablet; Take 1 tablet (160 mg total) by mouth daily

## 2025-03-07 NOTE — ASSESSMENT & PLAN NOTE
Patient wants second opinion. He was released to work but is still having a lot of shoulder pain  New referral provided  I also updated xrays ordered  He is doing physical therapy with minimal relief  Using rx nsaids and muscle relaxer not helping. Some relief with topicals  Orders:  •  Ambulatory Referral to Orthopedic Surgery; Future  •  XR shoulder 2+ vw right; Future  •  XR shoulder 2+ vw left; Future

## 2025-03-07 NOTE — PATIENT INSTRUCTIONS
"Patient Education     Routine physical for adults   The Basics   Written by the doctors and editors at [facility]   What is a physical? -- A physical is a routine visit, or \"check-up,\" with your doctor. You might also hear it called a \"wellness visit\" or \"preventive visit.\"  During each visit, the doctor will:   Ask about your physical and mental health   Ask about your habits, behaviors, and lifestyle   Do an exam   Give you vaccines if needed   Talk to you about any medicines you take   Give advice about your health   Answer your questions  Getting regular check-ups is an important part of taking care of your health. It can help your doctor find and treat any problems you have. But it's also important for preventing health problems.  A routine physical is different from a \"sick visit.\" A sick visit is when you see a doctor because of a health concern or problem. Since physicals are scheduled ahead of time, you can think about what you want to ask the doctor.  How often should I get a physical? -- It depends on your age and health. In general, for people age 21 years and older:   If you are younger than 50 years, you might be able to get a physical every 3 years.   If you are 50 years or older, your doctor might recommend a physical every year.  If you have an ongoing health condition, like diabetes or high blood pressure, your doctor will probably want to see you more often.  What happens during a physical? -- In general, each visit will include:   Physical exam - The doctor or nurse will check your height, weight, heart rate, and blood pressure. They will also look at your eyes and ears. They will ask about how you are feeling and whether you have any symptoms that bother you.   Medicines - It's a good idea to bring a list of all the medicines you take to each doctor visit. Your doctor will talk to you about your medicines and answer any questions. Tell them if you are having any side effects that bother you. You " "should also tell them if you are having trouble paying for any of your medicines.   Habits and behaviors - This includes:   Your diet   Your exercise habits   Whether you smoke, drink alcohol, or use drugs   Whether you are sexually active   Whether you feel safe at home  Your doctor will talk to you about things you can do to improve your health and lower your risk of health problems. They will also offer help and support. For example, if you want to quit smoking, they can give you advice and might prescribe medicines. If you want to improve your diet or get more physical activity, they can help you with this, too.   Lab tests, if needed - The tests you get will depend on your age and situation. For example, your doctor might want to check your:   Cholesterol   Blood sugar   Iron level   Vaccines - The recommended vaccines will depend on your age, health, and what vaccines you already had. Vaccines are very important because they can prevent certain serious or deadly infections.   Discussion of screening - \"Screening\" means checking for diseases or other health problems before they cause symptoms. Your doctor can recommend screening based on your age, risk, and preferences. This might include tests to check for:   Cancer, such as breast, prostate, cervical, ovarian, colorectal, prostate, lung, or skin cancer   Sexually transmitted infections, such as chlamydia and gonorrhea   Mental health conditions like depression and anxiety  Your doctor will talk to you about the different types of screening tests. They can help you decide which screenings to have. They can also explain what the results might mean.   Answering questions - The physical is a good time to ask the doctor or nurse questions about your health. If needed, they can refer you to other doctors or specialists, too.  Adults older than 65 years often need other care, too. As you get older, your doctor will talk to you about:   How to prevent falling at " home   Hearing or vision tests   Memory testing   How to take your medicines safely   Making sure that you have the help and support you need at home  All topics are updated as new evidence becomes available and our peer review process is complete.  This topic retrieved from [employer] on: May 02, 2024.  Topic 100985 Version 1.0  Release: 32.4.3 - C32.122  © 2024 UpToDate, Inc. and/or its affiliates. All rights reserved.  Consumer Information Use and Disclaimer   Disclaimer: This generalized information is a limited summary of diagnosis, treatment, and/or medication information. It is not meant to be comprehensive and should be used as a tool to help the user understand and/or assess potential diagnostic and treatment options. It does NOT include all information about conditions, treatments, medications, side effects, or risks that may apply to a specific patient. It is not intended to be medical advice or a substitute for the medical advice, diagnosis, or treatment of a health care provider based on the health care provider's examination and assessment of a patient's specific and unique circumstances. Patients must speak with a health care provider for complete information about their health, medical questions, and treatment options, including any risks or benefits regarding use of medications. This information does not endorse any treatments or medications as safe, effective, or approved for treating a specific patient. UpToDate, Inc. and its affiliates disclaim any warranty or liability relating to this information or the use thereof.The use of this information is governed by the Terms of Use, available at https://www.[employer].com/en/know/clinical-effectiveness-terms. 2024© UpToDate, Inc. and its affiliates and/or licensors. All rights reserved.  Copyright   © 2024 UpToDate, Inc. and/or its affiliates. All rights reserved.

## 2025-03-07 NOTE — LETTER
March 7, 2025     Patient: Zachariah Lacey  YOB: 1960  Date of Visit: 3/7/2025      To Whom it May Concern:    Zachariah Lacey is under my professional care. Zachariah was seen in my office on 3/7/2025. Zachariah may return to work on after surgery excused 4/8/2025 for 10 days after . Continue out of work from 2/26/2025-4/8/2025    If you have any questions or concerns, please don't hesitate to call.         Sincerely,          KORINA Palomino        CC: No Recipients

## 2025-03-07 NOTE — PROGRESS NOTES
Adult Annual Physical  Name: Zachariah Lacey      : 1960      MRN: [id number]  Encounter Provider: KORINA Palomino  Encounter Date: 3/7/2025   Encounter department: [facility] PRIMARY [facility]ARE    Assessment & Plan  Chronic pain of both shoulders  Patient wants second opinion. He was released to work but is still having a lot of shoulder pain  New referral provided  I also updated xrays ordered  He is doing physical therapy with minimal relief  Using rx nsaids and muscle relaxer not helping. Some relief with topicals  Orders:  •  Ambulatory Referral to Orthopedic Surgery; Future  •  XR shoulder 2+ vw right; Future  •  XR shoulder 2+ vw left; Future    Lipoma of torso  Patient has a lot of pain associated with new lipoma in the right posterior shoulder region affecting his ability to tolerate activity   I have taken him out of work for this until surgery scheduled 2025  Patient drives forklift at work.       Annual physical exam  Patient reminded to get his labs completed   Patient was called for his colonoscopy        Primary hypertension  Patient blood pressure is elevated and continues to be poorly controlled  He was on HCTZ discontinued because of urinary complaints   Recommend change olmesartan to valsartan   Recheck in 2 weeks   Orders:  •  valsartan (DIOVAN) 160 mg tablet; Take 1 tablet (160 mg total) by mouth daily    Immunizations and preventive care screenings were discussed with patient today. Appropriate education was printed on patient's after visit summary.    Discussed risks and benefits of prostate cancer screening. We discussed the controversial history of PSA screening for prostate cancer in the [place] as well as the risk of over detection and over treatment of prostate cancer by way of PSA screening.  The patient understands that PSA blood testing is an imperfect way to screen for prostate cancer and that elevated PSA levels in the blood may also be caused by infection,  inflammation, prostatic trauma or manipulation, urological procedures, or by benign prostatic enlargement.    The role of the digital rectal examination in prostate cancer screening was also discussed and I discussed with him that there is large interobserver variability in the findings of digital rectal examination.    Counseling:  Dental Health: discussed importance of regular tooth brushing, flossing, and dental visits.  Injury prevention: discussed safety/seat belts, safety helmets, smoke detectors, carbon monoxide detectors, and smoking near bedding or upholstery.  Sexual health: discussed sexually transmitted diseases, partner selection, use of condoms, avoidance of unintended pregnancy, and contraceptive alternatives.  Exercise: the importance of regular exercise/physical activity was discussed. Recommend exercise 3-5 times per week for at least 30 minutes.       Depression Screening and Follow-up Plan: Patient was screened for depression during today's encounter. They screened negative with a PHQ-2 score of 2.          History of Present Illness     Adult Annual Physical:  Patient presents for annual physical.     Diet and Physical Activity:  - Diet/Nutrition: well balanced diet.  - Exercise: no formal exercise. due to shoulder pain    Depression Screening:  - PHQ-2 Score: 2    General Health:  - Sleep: sleeps poorly. due to pain  - Hearing: decreased hearing bilateral ears.     Health:    - Urinary symptoms: none.     Review of Systems   Constitutional:  Positive for activity change. Negative for fatigue.   Respiratory:  Negative for chest tightness and shortness of breath.    Cardiovascular:  Negative for chest pain and palpitations.   Musculoskeletal:  Positive for arthralgias.   Neurological:  Negative for dizziness, light-headedness and headaches.   Psychiatric/Behavioral:  Positive for dysphoric mood and sleep disturbance. The patient is not nervous/anxious.          Objective   BP (!) 140/102 (BP  "Location: Left arm, Patient Position: Sitting, Cuff Size: Standard)   Pulse 78   Ht 5' 5\" (1.651 m)   Wt 85.3 kg (188 lb)   SpO2 98%   BMI 31.28 kg/m²     Physical Exam  Vitals and nursing note reviewed.   Constitutional:       Appearance: Normal appearance. He is well-developed. He is not ill-appearing.   HENT:      Head: Normocephalic and atraumatic.   Eyes:      Extraocular Movements: Extraocular movements intact.      Conjunctiva/sclera: Conjunctivae normal.      Pupils: Pupils are equal.   Cardiovascular:      Rate and Rhythm: Normal rate and regular rhythm.      Heart sounds: S1 normal and S2 normal. No murmur heard.  Pulmonary:      Effort: Pulmonary effort is normal. No respiratory distress.      Breath sounds: Normal breath sounds.   Abdominal:      General: Abdomen is flat.      Palpations: Abdomen is soft.   Musculoskeletal:      Right shoulder: Tenderness present. Normal range of motion. Normal strength.      Left shoulder: Tenderness present. Normal range of motion. Normal strength.   Neurological:      Mental Status: He is alert and oriented to person, place, and time.   Psychiatric:         Mood and Affect: Mood normal.         Thought Content: Thought content normal.         "

## 2025-03-07 NOTE — ASSESSMENT & PLAN NOTE
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

## 2025-03-13 ENCOUNTER — OFFICE VISIT (OUTPATIENT)
Dept: FAMILY MEDICINE CLINIC | Facility: CLINIC | Age: 65
End: 2025-03-13
Payer: COMMERCIAL

## 2025-03-13 VITALS
HEIGHT: 65 IN | SYSTOLIC BLOOD PRESSURE: 134 MMHG | WEIGHT: 187 LBS | HEART RATE: 80 BPM | TEMPERATURE: 99.7 F | OXYGEN SATURATION: 96 % | BODY MASS INDEX: 31.16 KG/M2 | DIASTOLIC BLOOD PRESSURE: 80 MMHG

## 2025-03-13 DIAGNOSIS — M25.512 CHRONIC PAIN OF BOTH SHOULDERS: ICD-10-CM

## 2025-03-13 DIAGNOSIS — M25.511 CHRONIC PAIN OF BOTH SHOULDERS: ICD-10-CM

## 2025-03-13 DIAGNOSIS — K59.00 CONSTIPATION, UNSPECIFIED CONSTIPATION TYPE: ICD-10-CM

## 2025-03-13 DIAGNOSIS — G89.29 CHRONIC PAIN OF BOTH SHOULDERS: ICD-10-CM

## 2025-03-13 DIAGNOSIS — I20.9 ANGINA PECTORIS (HCC): ICD-10-CM

## 2025-03-13 DIAGNOSIS — I10 PRIMARY HYPERTENSION: Primary | ICD-10-CM

## 2025-03-13 PROCEDURE — 99214 OFFICE O/P EST MOD 30 MIN: CPT | Performed by: NURSE PRACTITIONER

## 2025-03-13 NOTE — ASSESSMENT & PLAN NOTE
Patient will be getting second opinion with [facility] because he was released to work doing PT and has had no change of his shoulder symptoms.

## 2025-03-13 NOTE — PROGRESS NOTES
Name: Zachariah Lacey      : 1960      MRN: [id number]  Encounter Provider: KORINA Palomino  Encounter Date: 3/13/2025   Encounter department: [facility] PRIMARY [facility]ARE  :  Assessment & Plan  Primary hypertension  Patient blood pressure is much improved today since last visit  I recommend continue with valsartan   Recheck as scheduled         Chronic pain of both shoulders  Patient will be getting second opinion with [facility] because he was released to work doing PT and has had no change of his shoulder symptoms.        Constipation, unspecified constipation type  Patient has colace as needed  Can also take mirilax daily        Angina pectoris (HCC)  Check EKG in office  Possible related to stress and previously uncontrolled blood pressure last week   Orders:  •  POCT ECG           History of Present Illness   Patient states that he was having chest pain fo 2-3 days. His blood pressure medication was changed because it was elevated. He was scheduled for be seen in 2 weeks but he came sooner because of symptoms. He feeling afraid.   He is taking his new medication everyday   He went to lay down, had no other symptoms     He states he has a lot of questions related to surgery and other issues going on  He is also having issues with constipation that has also been the last 3 days     He having more pain in the area of his lipoma that also started a few days ago     Patient has second opinion with [facility] 3/21- holding off on xrays until that visit. Patient reports that he was returned to work  he been doing physical therapy and having no relief     He also states that since he not working he is feeling more tired  He is very anxious about something bad happening to him       Review of Systems   Constitutional:  Negative for diaphoresis and fatigue.   Respiratory:  Negative for chest tightness.    Cardiovascular:  Positive for chest pain. Negative for palpitations.   Gastrointestinal:  Negative  "for nausea.   Musculoskeletal:  Positive for arthralgias and myalgias.   Neurological:  Negative for dizziness, weakness, numbness and headaches.   Psychiatric/Behavioral:  Positive for sleep disturbance. The patient is nervous/anxious.        Objective   /80 (BP Location: Left arm, Patient Position: Sitting, Cuff Size: Adult)   Pulse 80   Temp 99.7 °F (37.6 °C) (Tympanic)   Ht 5' 5\" (1.651 m)   Wt 84.8 kg (187 lb)   SpO2 96%   BMI 31.12 kg/m²      Physical Exam  Vitals and nursing note reviewed.   Constitutional:       Appearance: Normal appearance. He is well-developed. He is not ill-appearing.   HENT:      Head: Normocephalic and atraumatic.   Eyes:      Extraocular Movements: Extraocular movements intact.      Conjunctiva/sclera: Conjunctivae normal.      Pupils: Pupils are equal.   Cardiovascular:      Rate and Rhythm: Normal rate and regular rhythm.      Heart sounds: S1 normal and S2 normal. No murmur heard.  Pulmonary:      Effort: Pulmonary effort is normal. No respiratory distress.      Breath sounds: Normal breath sounds.   Musculoskeletal:      Right shoulder: Deformity (posterior soft tissue lipoma- causes pain) and tenderness present. No swelling. Normal range of motion.      Left shoulder: Tenderness present. No swelling. Normal range of motion.   Neurological:      Mental Status: He is alert and oriented to person, place, and time.   Psychiatric:         Mood and Affect: Mood is anxious.         Behavior: Behavior is hyperactive.         "

## 2025-03-13 NOTE — ASSESSMENT & PLAN NOTE
Patient blood pressure is much improved today since last visit  I recommend continue with valsartan   Recheck as scheduled

## 2025-03-13 NOTE — ASSESSMENT & PLAN NOTE
Check EKG in office  Possible related to stress and previously uncontrolled blood pressure last week   Orders:  •  POCT ECG

## 2025-03-14 PROCEDURE — 93000 ELECTROCARDIOGRAM COMPLETE: CPT | Performed by: NURSE PRACTITIONER

## 2025-03-24 ENCOUNTER — OFFICE VISIT (OUTPATIENT)
Dept: FAMILY MEDICINE CLINIC | Facility: CLINIC | Age: 65
End: 2025-03-24
Payer: COMMERCIAL

## 2025-03-24 VITALS
HEART RATE: 77 BPM | HEIGHT: 65 IN | RESPIRATION RATE: 18 BRPM | WEIGHT: 188 LBS | DIASTOLIC BLOOD PRESSURE: 70 MMHG | OXYGEN SATURATION: 97 % | TEMPERATURE: 97.4 F | SYSTOLIC BLOOD PRESSURE: 138 MMHG | BODY MASS INDEX: 31.32 KG/M2

## 2025-03-24 DIAGNOSIS — B35.6 JOCK ITCH: ICD-10-CM

## 2025-03-24 DIAGNOSIS — J34.89 STUFFY AND RUNNY NOSE: ICD-10-CM

## 2025-03-24 DIAGNOSIS — M25.511 CHRONIC PAIN OF BOTH SHOULDERS: ICD-10-CM

## 2025-03-24 DIAGNOSIS — G89.29 CHRONIC PAIN OF BOTH SHOULDERS: ICD-10-CM

## 2025-03-24 DIAGNOSIS — I10 PRIMARY HYPERTENSION: Primary | ICD-10-CM

## 2025-03-24 DIAGNOSIS — M25.512 CHRONIC PAIN OF BOTH SHOULDERS: ICD-10-CM

## 2025-03-24 PROCEDURE — 99214 OFFICE O/P EST MOD 30 MIN: CPT | Performed by: NURSE PRACTITIONER

## 2025-03-24 RX ORDER — AMLODIPINE BESYLATE 10 MG/1
10 TABLET ORAL DAILY
Qty: 90 TABLET | Refills: 1 | Status: SHIPPED | OUTPATIENT
Start: 2025-03-24

## 2025-03-24 RX ORDER — CLOTRIMAZOLE AND BETAMETHASONE DIPROPIONATE 10; .64 MG/G; MG/G
CREAM TOPICAL 2 TIMES DAILY
Qty: 45 G | Refills: 3 | Status: SHIPPED | OUTPATIENT
Start: 2025-03-24

## 2025-03-24 RX ORDER — SENNOSIDES 8.6 MG
650 CAPSULE ORAL EVERY 8 HOURS PRN
Qty: 30 TABLET | Refills: 0 | Status: SHIPPED | OUTPATIENT
Start: 2025-03-24

## 2025-03-24 RX ORDER — BROMPHENIRAMINE MALEATE, PSEUDOEPHEDRINE HYDROCHLORIDE, AND DEXTROMETHORPHAN HYDROBROMIDE 2; 30; 10 MG/5ML; MG/5ML; MG/5ML
5 SYRUP ORAL 4 TIMES DAILY PRN
Qty: 120 ML | Refills: 0 | Status: SHIPPED | OUTPATIENT
Start: 2025-03-24

## 2025-03-24 RX ORDER — VALSARTAN 160 MG/1
160 TABLET ORAL DAILY
Qty: 90 TABLET | Refills: 1 | Status: SHIPPED | OUTPATIENT
Start: 2025-03-24

## 2025-03-24 NOTE — PROGRESS NOTES
Name: Zachariah Lacey      : 1960      MRN: [id number]  Encounter Provider: KORINA Palomino  Encounter Date: 3/24/2025   Encounter department: [facility]  :  Assessment & Plan  Primary hypertension  Patient blood pressure is doing well  Continue with amlodipine 10mg and valsartan 160mgg   Orders:  •  amLODIPine (NORVASC) 10 mg tablet; Take 1 tablet (10 mg total) by mouth daily  •  valsartan (DIOVAN) 160 mg tablet; Take 1 tablet (160 mg total) by mouth daily    Jock itch  Mediation refilled   Orders:  •  clotrimazole-betamethasone (LOTRISONE) 1-0.05 % cream; Apply topically 2 (two) times a day    Chronic pain of both shoulders  Patient advised he can only use tylenol 10 days prior to his lipoma surgery   He now has second opinion. Scheduled for MRI on Monday  Out of work by them for one month   Orders:  •  acetaminophen (Tylenol 8 Hour) 650 mg CR tablet; Take 1 tablet (650 mg total) by mouth every 8 (eight) hours as needed for mild pain    Stuffy and runny nose  Cough syrup recommended   Orders:  •  brompheniramine-pseudoephedrine-DM 30-2-10 MG/5ML syrup; Take 5 mL by mouth 4 (four) times a day as needed for congestion, cough or allergies           History of Present Illness   Patient presents today for follow up on his blood pressure  Doing well with blood pressure medication   He also has visit with new orthopedic doctor- he has MRI scheduled for Monday. He was taken out of work for one month     He also started with congestion/runny nose and coughing         Review of Systems   Constitutional:  Negative for chills, diaphoresis, fatigue and fever.   HENT:  Positive for rhinorrhea. Negative for congestion, ear pain, sinus pressure, sinus pain, sneezing and sore throat.    Respiratory:  Positive for cough.    Musculoskeletal:  Positive for arthralgias.   Neurological:  Negative for dizziness, light-headedness and headaches.   Psychiatric/Behavioral:  Positive for sleep disturbance.   "      Objective   /70 (BP Location: Left arm, Patient Position: Sitting, Cuff Size: Adult)   Pulse 77   Temp (!) 97.4 °F (36.3 °C) (Tympanic)   Resp 18   Ht 5' 5\" (1.651 m)   Wt 85.3 kg (188 lb)   SpO2 97%   BMI 31.28 kg/m²      Physical Exam  Vitals and nursing note reviewed.   Constitutional:       Appearance: Normal appearance. He is well-developed. He is not ill-appearing.   HENT:      Head: Normocephalic and atraumatic.      Right Ear: Tympanic membrane normal.      Left Ear: Tympanic membrane normal.      Nose: Rhinorrhea present. Rhinorrhea is clear.      Mouth/Throat:      Lips: Pink.      Mouth: Mucous membranes are moist.      Pharynx: Oropharynx is clear. No posterior oropharyngeal erythema or postnasal drip.   Eyes:      Extraocular Movements: Extraocular movements intact.      Conjunctiva/sclera: Conjunctivae normal.      Pupils: Pupils are equal.   Cardiovascular:      Rate and Rhythm: Normal rate and regular rhythm.      Heart sounds: S1 normal and S2 normal. No murmur heard.  Pulmonary:      Effort: Pulmonary effort is normal. No respiratory distress.      Breath sounds: Normal breath sounds.   Neurological:      Mental Status: He is alert and oriented to person, place, and time.   Psychiatric:         Mood and Affect: Mood normal.         Thought Content: Thought content normal.         "

## 2025-03-24 NOTE — ASSESSMENT & PLAN NOTE
Patient blood pressure is doing well  Continue with amlodipine 10mg and valsartan 160mgg   Orders:  •  amLODIPine (NORVASC) 10 mg tablet; Take 1 tablet (10 mg total) by mouth daily  •  valsartan (DIOVAN) 160 mg tablet; Take 1 tablet (160 mg total) by mouth daily

## 2025-03-24 NOTE — ASSESSMENT & PLAN NOTE
Patient advised he can only use tylenol 10 days prior to his lipoma surgery   He now has second opinion. Scheduled for MRI on Monday  Out of work by them for one month   Orders:  •  acetaminophen (Tylenol 8 Hour) 650 mg CR tablet; Take 1 tablet (650 mg total) by mouth every 8 (eight) hours as needed for mild pain

## 2025-03-26 ENCOUNTER — ANESTHESIA EVENT (OUTPATIENT)
Dept: PERIOP | Facility: HOSPITAL | Age: 65
End: 2025-03-26
Payer: COMMERCIAL

## 2025-04-06 NOTE — ANESTHESIA PREPROCEDURE EVALUATION
Procedure:  EXCISION  BIOPSY LESION/MASS upper BACK (Right: Back)    ECG: NSR with nonspecific T wave abnormality    HTN - amlodipine and valsartan    Denies the following: CP/SOB with exertion, asthma, COPD, LONA, stroke/TIA, seizure    Relevant Problems   CARDIO   (+) Angina pectoris (HCC)   (+) Hemorrhoid   (+) Hyperlipidemia   (+) Hypertension   (+) Systolic murmur      GI/HEPATIC   (+) Hiatal hernia      /RENAL   (+) Nephrolithiasis      MUSCULOSKELETAL   (+) Hiatal hernia      NEURO/PSYCH   (+) Chronic pain of both shoulders        Physical Exam    Airway    Mallampati score: II  TM Distance: >3 FB  Neck ROM: full     Dental       Cardiovascular      Pulmonary      Other Findings        Anesthesia Plan  ASA Score- 2     Anesthesia Type- IV sedation with anesthesia with ASA Monitors.         Additional Monitors:     Airway Plan:     Comment: GA ETT vs MAC.       Plan Factors-Exercise tolerance (METS): >4 METS.    Chart reviewed. EKG reviewed.  Existing labs reviewed. Patient summary reviewed.    Patient is not a current smoker.      Obstructive sleep apnea risk education given perioperatively.        Induction-     Postoperative Plan-         Informed Consent- Anesthetic plan and risks discussed with patient.  I personally reviewed this patient with the CRNA. Discussed and agreed on the Anesthesia Plan with the CRNA..      NPO Status:  No vitals data found for the desired time range.

## 2025-04-07 ENCOUNTER — OFFICE VISIT (OUTPATIENT)
Dept: FAMILY MEDICINE CLINIC | Facility: CLINIC | Age: 65
End: 2025-04-07
Payer: COMMERCIAL

## 2025-04-07 VITALS
TEMPERATURE: 98.2 F | BODY MASS INDEX: 30.82 KG/M2 | SYSTOLIC BLOOD PRESSURE: 148 MMHG | DIASTOLIC BLOOD PRESSURE: 80 MMHG | RESPIRATION RATE: 16 BRPM | HEART RATE: 76 BPM | OXYGEN SATURATION: 98 % | WEIGHT: 185 LBS | HEIGHT: 65 IN

## 2025-04-07 DIAGNOSIS — I10 PRIMARY HYPERTENSION: ICD-10-CM

## 2025-04-07 DIAGNOSIS — J34.89 STUFFY AND RUNNY NOSE: ICD-10-CM

## 2025-04-07 PROCEDURE — 99214 OFFICE O/P EST MOD 30 MIN: CPT | Performed by: NURSE PRACTITIONER

## 2025-04-07 RX ORDER — VALSARTAN 160 MG/1
160 TABLET ORAL DAILY
Qty: 90 TABLET | Refills: 1 | Status: SHIPPED | OUTPATIENT
Start: 2025-04-07

## 2025-04-07 RX ORDER — HYDROXYZINE PAMOATE 25 MG/1
25 CAPSULE ORAL
COMMUNITY

## 2025-04-07 RX ORDER — OLMESARTAN MEDOXOMIL 40 MG/1
40 TABLET ORAL
COMMUNITY
End: 2025-04-07 | Stop reason: ALTCHOICE

## 2025-04-07 RX ORDER — BROMPHENIRAMINE MALEATE, PSEUDOEPHEDRINE HYDROCHLORIDE, AND DEXTROMETHORPHAN HYDROBROMIDE 2; 30; 10 MG/5ML; MG/5ML; MG/5ML
5 SYRUP ORAL 4 TIMES DAILY PRN
Qty: 120 ML | Refills: 3 | Status: SHIPPED | OUTPATIENT
Start: 2025-04-07

## 2025-04-07 NOTE — PROGRESS NOTES
"Name: Zachariah Lacey      : 1960      MRN: [id number]  Encounter Provider: KORINA Palomino  Encounter Date: 2025   Encounter department: [facility]  :  Assessment & Plan  Primary hypertension  Patient blood pressure is elevated today  He missed his blood pressure  medication for 3 days states the pharmacy only gave him amlodipine  He is suppose to be on diovan as well  Orders:  •  valsartan (DIOVAN) 160 mg tablet; Take 1 tablet (160 mg total) by mouth daily    Stuffy and runny nose  Refill bromphed this has been helpful   Orders:  •  brompheniramine-pseudoephedrine-DM 30-2-10 MG/5ML syrup; Take 5 mL by mouth 4 (four) times a day as needed for congestion, cough or allergies           History of Present Illness   Patient presents today for follow up   Since his second opinion he had MRI and was found to have rotator cuff tear     25 right rotator cuff repair scheduled  He is upset about needing surgery because of the recovery  He is afraid today as well because he has a slight cold   He has been missing his blood pressure medication now for the last 3 days- valsartan       Review of Systems   Constitutional:  Negative for chills, diaphoresis, fatigue and fever.   HENT:  Positive for congestion and rhinorrhea.    Respiratory:  Positive for cough. Negative for chest tightness and shortness of breath.    Musculoskeletal:  Positive for arthralgias.   Neurological:  Negative for dizziness, light-headedness and headaches.       Objective   /80 (BP Location: Left arm, Patient Position: Sitting, Cuff Size: Adult)   Pulse 76   Temp 98.2 °F (36.8 °C) (Tympanic)   Resp 16   Ht 5' 5\" (1.651 m)   Wt 83.9 kg (185 lb)   SpO2 98%   BMI 30.79 kg/m²      Physical Exam  Vitals and nursing note reviewed.   Constitutional:       Appearance: Normal appearance. He is well-developed. He is not ill-appearing.   HENT:      Head: Normocephalic and atraumatic.      Right Ear: Tympanic " membrane normal.      Left Ear: Tympanic membrane normal.      Nose: Mucosal edema present.      Mouth/Throat:      Lips: Pink.      Mouth: Mucous membranes are moist.      Pharynx: No posterior oropharyngeal erythema or postnasal drip.   Eyes:      Extraocular Movements: Extraocular movements intact.      Conjunctiva/sclera: Conjunctivae normal.      Pupils: Pupils are equal.   Cardiovascular:      Rate and Rhythm: Normal rate and regular rhythm.      Pulses:           Carotid pulses are 2+ on the right side and 2+ on the left side.     Heart sounds: S1 normal and S2 normal. No murmur heard.  Pulmonary:      Effort: Pulmonary effort is normal. No respiratory distress.      Breath sounds: Normal breath sounds. No decreased breath sounds, wheezing or rhonchi.   Lymphadenopathy:      Cervical: No cervical adenopathy.   Neurological:      Mental Status: He is alert and oriented to person, place, and time.   Psychiatric:         Mood and Affect: Mood normal.         Behavior: Behavior normal.         Thought Content: Thought content normal.         Judgment: Judgment normal.

## 2025-04-07 NOTE — ASSESSMENT & PLAN NOTE
Patient blood pressure is elevated today  He missed his blood pressure  medication for 3 days states the pharmacy only gave him amlodipine  He is suppose to be on diovan as well  Orders:  •  valsartan (DIOVAN) 160 mg tablet; Take 1 tablet (160 mg total) by mouth daily

## 2025-04-08 ENCOUNTER — HOSPITAL ENCOUNTER (OUTPATIENT)
Facility: HOSPITAL | Age: 65
Setting detail: OUTPATIENT SURGERY
Discharge: HOME | End: 2025-04-08
Attending: STUDENT IN AN ORGANIZED HEALTH CARE EDUCATION/TRAINING PROGRAM | Admitting: STUDENT IN AN ORGANIZED HEALTH CARE EDUCATION/TRAINING PROGRAM
Payer: COMMERCIAL

## 2025-04-08 ENCOUNTER — ANESTHESIA (OUTPATIENT)
Dept: PERIOP | Facility: HOSPITAL | Age: 65
End: 2025-04-08
Payer: COMMERCIAL

## 2025-04-08 VITALS
TEMPERATURE: 97.6 F | WEIGHT: 185 LBS | SYSTOLIC BLOOD PRESSURE: 143 MMHG | BODY MASS INDEX: 30.82 KG/M2 | HEART RATE: 60 BPM | DIASTOLIC BLOOD PRESSURE: 85 MMHG | HEIGHT: 65 IN | OXYGEN SATURATION: 95 % | RESPIRATION RATE: 16 BRPM

## 2025-04-08 DIAGNOSIS — D17.1 LIPOMA OF TORSO: ICD-10-CM

## 2025-04-08 LAB
ATRIAL RATE: 57 BPM
ATRIAL RATE: 59 BPM
P AXIS: 118 DEGREES
P AXIS: 57 DEGREES
PR INTERVAL: 174 MS
PR INTERVAL: 178 MS
QRS AXIS: 140 DEGREES
QRS AXIS: 44 DEGREES
QRSD INTERVAL: 94 MS
QRSD INTERVAL: 94 MS
QT INTERVAL: 422 MS
QT INTERVAL: 424 MS
QTC INTERVAL: 413 MS
QTC INTERVAL: 418 MS
T WAVE AXIS: 134 DEGREES
T WAVE AXIS: 46 DEGREES
VENTRICULAR RATE: 57 BPM
VENTRICULAR RATE: 59 BPM

## 2025-04-08 PROCEDURE — 93010 ELECTROCARDIOGRAM REPORT: CPT | Performed by: INTERNAL MEDICINE

## 2025-04-08 PROCEDURE — 88304 TISSUE EXAM BY PATHOLOGIST: CPT | Performed by: PATHOLOGY

## 2025-04-08 PROCEDURE — NC001 PR NO CHARGE: Performed by: STUDENT IN AN ORGANIZED HEALTH CARE EDUCATION/TRAINING PROGRAM

## 2025-04-08 PROCEDURE — 93005 ELECTROCARDIOGRAM TRACING: CPT

## 2025-04-08 PROCEDURE — 21933 EXC BACK TUM DEEP 5 CM/>: CPT | Performed by: STUDENT IN AN ORGANIZED HEALTH CARE EDUCATION/TRAINING PROGRAM

## 2025-04-08 RX ORDER — ALBUTEROL SULFATE 0.83 MG/ML
2.5 SOLUTION RESPIRATORY (INHALATION) ONCE AS NEEDED
Status: DISCONTINUED | OUTPATIENT
Start: 2025-04-08 | End: 2025-04-08 | Stop reason: HOSPADM

## 2025-04-08 RX ORDER — PROPOFOL 10 MG/ML
INJECTION, EMULSION INTRAVENOUS AS NEEDED
Status: DISCONTINUED | OUTPATIENT
Start: 2025-04-08 | End: 2025-04-08

## 2025-04-08 RX ORDER — ONDANSETRON 2 MG/ML
4 INJECTION INTRAMUSCULAR; INTRAVENOUS ONCE AS NEEDED
Status: DISCONTINUED | OUTPATIENT
Start: 2025-04-08 | End: 2025-04-08 | Stop reason: HOSPADM

## 2025-04-08 RX ORDER — BUPIVACAINE HYDROCHLORIDE 2.5 MG/ML
INJECTION, SOLUTION EPIDURAL; INFILTRATION; INTRACAUDAL; PERINEURAL AS NEEDED
Status: DISCONTINUED | OUTPATIENT
Start: 2025-04-08 | End: 2025-04-08 | Stop reason: HOSPADM

## 2025-04-08 RX ORDER — MAGNESIUM HYDROXIDE 1200 MG/15ML
LIQUID ORAL AS NEEDED
Status: DISCONTINUED | OUTPATIENT
Start: 2025-04-08 | End: 2025-04-08 | Stop reason: HOSPADM

## 2025-04-08 RX ORDER — CEFAZOLIN SODIUM 2 G/50ML
2000 SOLUTION INTRAVENOUS ONCE
Status: COMPLETED | OUTPATIENT
Start: 2025-04-08 | End: 2025-04-08

## 2025-04-08 RX ORDER — SODIUM CHLORIDE, SODIUM LACTATE, POTASSIUM CHLORIDE, CALCIUM CHLORIDE 600; 310; 30; 20 MG/100ML; MG/100ML; MG/100ML; MG/100ML
125 INJECTION, SOLUTION INTRAVENOUS CONTINUOUS
Status: DISCONTINUED | OUTPATIENT
Start: 2025-04-08 | End: 2025-04-08 | Stop reason: HOSPADM

## 2025-04-08 RX ORDER — MIDAZOLAM HYDROCHLORIDE 2 MG/2ML
INJECTION, SOLUTION INTRAMUSCULAR; INTRAVENOUS AS NEEDED
Status: DISCONTINUED | OUTPATIENT
Start: 2025-04-08 | End: 2025-04-08

## 2025-04-08 RX ORDER — HYDROMORPHONE HCL/PF 1 MG/ML
0.5 SYRINGE (ML) INJECTION
Status: DISCONTINUED | OUTPATIENT
Start: 2025-04-08 | End: 2025-04-08 | Stop reason: HOSPADM

## 2025-04-08 RX ORDER — FENTANYL CITRATE/PF 50 MCG/ML
25 SYRINGE (ML) INJECTION
Status: DISCONTINUED | OUTPATIENT
Start: 2025-04-08 | End: 2025-04-08 | Stop reason: HOSPADM

## 2025-04-08 RX ORDER — FENTANYL CITRATE 50 UG/ML
INJECTION, SOLUTION INTRAMUSCULAR; INTRAVENOUS AS NEEDED
Status: DISCONTINUED | OUTPATIENT
Start: 2025-04-08 | End: 2025-04-08

## 2025-04-08 RX ORDER — OXYCODONE HYDROCHLORIDE 5 MG/1
5 TABLET ORAL ONCE AS NEEDED
Refills: 0 | Status: COMPLETED | OUTPATIENT
Start: 2025-04-08 | End: 2025-04-08

## 2025-04-08 RX ORDER — PROPOFOL 10 MG/ML
INJECTION, EMULSION INTRAVENOUS CONTINUOUS PRN
Status: DISCONTINUED | OUTPATIENT
Start: 2025-04-08 | End: 2025-04-08

## 2025-04-08 RX ORDER — LIDOCAINE HYDROCHLORIDE 10 MG/ML
INJECTION, SOLUTION EPIDURAL; INFILTRATION; INTRACAUDAL; PERINEURAL AS NEEDED
Status: DISCONTINUED | OUTPATIENT
Start: 2025-04-08 | End: 2025-04-08

## 2025-04-08 RX ADMIN — LIDOCAINE HYDROCHLORIDE 50 MG: 10 SOLUTION INTRAVENOUS at 10:35

## 2025-04-08 RX ADMIN — PROPOFOL 50 MG: 10 INJECTION, EMULSION INTRAVENOUS at 10:36

## 2025-04-08 RX ADMIN — FENTANYL CITRATE 50 MCG: 50 INJECTION, SOLUTION INTRAMUSCULAR; INTRAVENOUS at 10:36

## 2025-04-08 RX ADMIN — MIDAZOLAM 2 MG: 1 INJECTION INTRAMUSCULAR; INTRAVENOUS at 10:29

## 2025-04-08 RX ADMIN — SODIUM CHLORIDE, SODIUM LACTATE, POTASSIUM CHLORIDE, AND CALCIUM CHLORIDE 125 ML/HR: .6; .31; .03; .02 INJECTION, SOLUTION INTRAVENOUS at 10:14

## 2025-04-08 RX ADMIN — PROPOFOL 100 MCG/KG/MIN: 10 INJECTION, EMULSION INTRAVENOUS at 10:35

## 2025-04-08 RX ADMIN — FENTANYL CITRATE 50 MCG: 50 INJECTION, SOLUTION INTRAMUSCULAR; INTRAVENOUS at 10:35

## 2025-04-08 RX ADMIN — CEFAZOLIN SODIUM 2000 MG: 2 SOLUTION INTRAVENOUS at 10:28

## 2025-04-08 RX ADMIN — OXYCODONE HYDROCHLORIDE 5 MG: 5 TABLET ORAL at 13:13

## 2025-04-08 NOTE — NURSING NOTE
Pt returned to APU awake,alert,C/O 10/10 operative pain, BP and HR are decreased, Dr Tang made aware, EKG ordered. Other than operative pain, no other complaints.

## 2025-04-08 NOTE — OP NOTE
OPERATIVE REPORT  PATIENT NAME: Zachariah Lacey    :  1960  MRN: [id number]  Pt Location:  OR ROOM 08    SURGERY DATE: 2025    Surgeons and Role:     * Santi Ling DO - Primary    Preop Diagnosis:  Lipoma of torso [D17.1]    Post-Op Diagnosis Codes:     * Lipoma of torso [D17.1]    Procedure(s):  Right - EXCISION  BIOPSY LESION/MASS upper BACK    Specimen(s):  ID Type Source Tests Collected by Time Destination   1 : UPPER BACK MASS Tissue Soft Tissue, Lipoma TISSUE EXAM Santi LingDO 2025 1050        Estimated Blood Loss:   Minimal    Drains:  * No LDAs found *    Anesthesia Type:   Choice    Operative Indications:  Lipoma of torso [D17.1]  Soft tissue mass in back on right side growing in size and indicated for removal.    Operative Findings:  Soft tissue mass right upper back intramuscular 6.2 cm      Complications:   None    Procedure and Technique:  The patient was seen in the Holding Room. The risks, benefits, complications, treatment options, and expected outcomes were discussed with the patient. The possibilities of reaction to medication, bleeding, infection, the need for additional procedures, failure to diagnose a condition, and creating a complication were discussed with the patient. The patient concurred with the proposed plan, giving informed consent.  The site of surgery properly noted/marked. The patient was taken to Operating Room, identified as Zachariah Lacey and staff verified patient name, , procedure, site, and laterality. A Time Out was held and the above information confirmed.    The patient was placed lying supine and turning.  The trunk was prepped and draped in standard fashion.  Local anesthesia was used to anesthetize the skin surrounding  the lesion.  A transverse incision was made over the lesion.  Sharp and blunt dissection,Using scissors, knife, and cautery, were used to mobilize the mass which was in a intramuscular location. Skin, soft tissue, and  the mass, and surrounding fat were taken. Hemostasis was achieved with cautery. The wound was irrigated.  The wound was closed in multiple layers using 3-0 Vicryl suture for subcutaneous tissue and 4-0 Monocryl subcuticular stitch. The wound was dressed, and the patient was taken to PACU in stable condition.    Sponge, needle, and instrument counts were correct x2.      I was present for the entire procedure.    Patient Disposition:  PACU              SIGNATURE: Santi Ling DO  DATE: April 8, 2025  TIME: 11:17 AM

## 2025-04-08 NOTE — DISCHARGE INSTR - AVS FIRST PAGE
Acute Care Surgery Discharge Instructions    Please follow-up as instructed. If you do not already have a follow-up appointment, please call the office when you leave to schedule an appointment to be seen in 2-3 weeks for post-operative re-evaluation.    Activity:  - No lifting greater than 20 pounds or strenuous physical activity or exercise for 2 weeks.  - Walking and normal light activities are encouraged.  - Normal daily activities including climbing steps are okay.  - No driving until no longer using pain medications.    Return to work:    - You may return to work as soon as you are feeling well enough.    Diet:    - You may resume your normal diet.    Wound Care:  - May shower daily. No tub baths or swimming until cleared by your surgeon.  - Wash incision gently with soap and water and pat dry.  - Do not apply any creams or ointments unless instructed to do so by your surgeon.  - You may apply ice as needed (no longer than 20 minutes at a time) for the first 48 hours.  - Bruising is not unusual and will go away with a little time. You may apply a warm, moist compress that may help the bruising resolve quicker.  - You may remove the dressings the day after surgery (unless otherwise instructed). Leave any skin tapes (steri-strips) on the incision(s) in place until they fall off on their own. Any new dressings are optional.    Medications:    - You may resume all of your regular medications after discharge unless otherwise instructed. Please refer to your discharge medication list for further details.  - Please take the pain medications as directed.  - You are encouraged to use non-narcotic pain medications first and whenever possible. Reserve the use of narcotic pain medication for moderate to severe pain not controlled by non-narcotic medications.  - No driving while taking narcotic pain medications.  - You may become constipated, especially if taking pain medications. You may take any over the counter stool  softeners or laxatives as needed. Examples: Milk of Magnesia, Colace, Senna.    Additional Instructions:  - If you have any questions or concerns after discharge please call the office.  - Call office or return to ER if fever greater than 101, chills, persistent nausea/vomiting, worsening/uncontrollable pain, and/or increasing redness or purulent/foul smelling drainage from incision(s).

## 2025-04-08 NOTE — NURSING NOTE
Patient discharged to home, IV removed.  Discharge instructions given and reviewed with patient and stated understanding. Patient tolerated toast and juice.  Patient left unit via w/c with belongings in stable condition.

## 2025-04-08 NOTE — ANESTHESIA POSTPROCEDURE EVALUATION
Post-Op Assessment Note    CV Status:  Stable    Pain management: adequate       Mental Status:  Alert and awake   Hydration Status:  Euvolemic   PONV Controlled:  Controlled   Airway Patency:  Patent     Post Op Vitals Reviewed: Yes    No anethesia notable event occurred.    Staff: Anesthesiologist, with CRNAs           Last Filed PACU Vitals:  Vitals Value Taken Time   Temp 98 °F (36.7 °C) 04/08/25 1121   Pulse 82 04/08/25 1148   /70 04/08/25 1145   Resp 17 04/08/25 1148   SpO2 95 % 04/08/25 1148   Vitals shown include unfiled device data.    Modified Oli:     Vitals Value Taken Time   Activity 2 04/08/25 1145   Respiration 2 04/08/25 1145   Circulation 2 04/08/25 1145   Consciousness 2 04/08/25 1145   Oxygen Saturation 2 04/08/25 1145     Modified Oli Score: 10

## 2025-04-08 NOTE — H&P
Name: Zachariah Lacey      : 1960      MRN: [id number]  Encounter Provider: Santi Ling DO  Encounter Date: 2025   Encounter department: [facility] [place]  :  Assessment & Plan  Lipoma of torso  Patient with right upper back soft tissue mass likely lipoma notable on exam - it has been growing in size and causing him discomfort with activity. I reviewed his imaging and it appears to be lipoma. Discussed risks benefits alternatives with him and he would like to proceed with excision of this mass.     Orders:    Ambulatory Referral to General Surgery    Diet NPO; Sips with meds; Standing    Apply Sequential Compression Device; Standing    Place sequential compression device; Standing    Case request operating room: EXCISION  BIOPSY LESION/MASS BACK; Standing           History of Present Illness     Zachariah Lacey is a 64 y.o. male who presents back mass  Patient presents with:  Lipoma: Patient is here today regarding a Lump on the right upper part of his back. Painful to the touch. Unsure how long he has had the lump.               Review of Systems   All other systems reviewed and are negative.   as per HPI.  Medical History Reviewed by provider this encounter:  Tobacco  Allergies  Meds  Problems  Med Hx  Surg Hx  Fam Hx     .  Past Medical History  Medical History        Past Medical History:   Diagnosis Date    Anxiety       last assessed 2017    Chest pain 3/26/2021    COVID-19 2020- symptom onset with sore throat, fever, congestion, headache.    Diverticulitis of colon       last assessed 2012    Hematuria 2014     Description: 6 mm right-sided kidney stone    Heme positive stool 11/10/2014     Description: Referred to Dr. Rudy Laughlin 11/10/14    Hiatal hernia       last assessed 2012    IBS (irritable bowel syndrome)       last assessed 2012    Multiple joint pain 2018    Right shoulder pain 2014    Tinea  corporis 2/6/2020         Surgical History         Past Surgical History:   Procedure Laterality Date    APPENDECTOMY        GALLBLADDER SURGERY             Family History         Family History   Problem Relation Age of Onset    Alcohol abuse Father            reports that he has never smoked. He has never used smokeless tobacco. He reports that he does not drink alcohol and does not use drugs.       Current Outpatient Medications   Medication Instructions    amLODIPine (NORVASC) 10 mg, Oral, Daily    atorvastatin (LIPITOR) 20 mg tablet TOME 1 TABLETA POR VIA ORAL TODOS LOS SANDY    diclofenac sodium (VOLTAREN) 50 mg, Oral, 3 times daily    docusate sodium (COLACE) 100 mg, Oral, Daily    hydrocortisone (ANUSOL-HC) 2.5 % rectal cream Topical, 2 times daily    methocarbamol (ROBAXIN) 500 mg, Oral, Daily at bedtime PRN    mirtazapine (REMERON) 7.5 mg, Oral, Daily at bedtime    olmesartan (BENICAR) 40 mg, Oral, Daily      Allergies         Allergies   Allergen Reactions    Elavil [Amitriptyline] Other (See Comments)       Burning of skin sensation         Medications Ordered Prior to Encounter          Current Outpatient Medications on File Prior to Visit   Medication Sig Dispense Refill    amLODIPine (NORVASC) 10 mg tablet Take 1 tablet (10 mg total) by mouth daily 90 tablet 1    atorvastatin (LIPITOR) 20 mg tablet TOME 1 TABLETA POR VIA ORAL TODOS LOS SANDY 30 tablet 5    diclofenac (VOLTAREN) 50 mg EC tablet Take 1 tablet (50 mg total) by mouth 3 (three) times a day 90 tablet 3    docusate sodium (COLACE) 100 mg capsule Take 1 capsule (100 mg total) by mouth in the morning 90 capsule 3    methocarbamol (ROBAXIN) 500 mg tablet Take 1 tablet (500 mg total) by mouth daily at bedtime as needed for muscle spasms 90 tablet 0    mirtazapine (REMERON) 7.5 MG tablet Take 1 tablet (7.5 mg total) by mouth daily at bedtime 90 tablet 0    olmesartan (BENICAR) 40 mg tablet Take 1 tablet (40 mg total) by mouth daily 90 tablet 0     "hydrocortisone (ANUSOL-HC) 2.5 % rectal cream Apply topically 2 (two) times a day (Patient not taking: Reported on 2/24/2025) 28 g 1      No current facility-administered medications on file prior to visit.         Social History              Tobacco Use    Smoking status: Never    Smokeless tobacco: Never   Vaping Use    Vaping status: Never Used   Substance and Sexual Activity    Alcohol use: No    Drug use: No    Sexual activity: Not on file            Objective[]Expand by Default     /90 (BP Location: Left arm, Patient Position: Sitting, Cuff Size: Large)   Pulse 82   Temp 97.7 °F (36.5 °C) (Tympanic)   Resp 16   Ht 5' 5\" (1.651 m)   Wt 84.4 kg (186 lb)   SpO2 96%   BMI 30.95 kg/m²      Physical Exam  Vitals and nursing note reviewed.   Constitutional:       General: He is not in acute distress.     Appearance: Normal appearance. He is well-developed. He is not ill-appearing.   HENT:      Head: Normocephalic.      Right Ear: External ear normal.      Left Ear: External ear normal.      Mouth/Throat:      Pharynx: Oropharynx is clear.   Eyes:      General:         Right eye: No discharge.         Left eye: No discharge.      Extraocular Movements: Extraocular movements intact.      Conjunctiva/sclera: Conjunctivae normal.   Cardiovascular:      Rate and Rhythm: Normal rate.   Pulmonary:      Effort: Pulmonary effort is normal. No respiratory distress.   Musculoskeletal:         General: Normal range of motion.      Cervical back: Normal range of motion.      Comments: Right upper back mobile tender soft tissue mass   Skin:     General: Skin is warm.   Neurological:      General: No focal deficit present.      Mental Status: He is alert and oriented to person, place, and time.   Psychiatric:         Mood and Affect: Mood normal.             Radiology Results Review: I personally reviewed the following image studies in PACS and associated radiology reports: CT chest. My interpretation of the radiology " images/reports is: right upper back lipoma.

## 2025-04-08 NOTE — ANESTHESIA POSTPROCEDURE EVALUATION
Post-Op Assessment Note    CV Status:  Stable  Pain Score: 0    Pain management: adequate       Mental Status:  Alert   Hydration Status:  Stable   PONV Controlled:  Controlled   Airway Patency:  Patent     Post Op Vitals Reviewed: Yes    No anethesia notable event occurred.    Staff: CRNA           Last Filed PACU Vitals:  Vitals Value Taken Time   Temp     Pulse 104 04/08/25 1124   /72 04/08/25 1121   Resp 20 04/08/25 1124   SpO2 94 04/08/25 1124   Vitals shown include unfiled device data.

## 2025-04-10 PROCEDURE — 88304 TISSUE EXAM BY PATHOLOGIST: CPT | Performed by: PATHOLOGY

## 2025-04-16 NOTE — PROGRESS NOTES
Assessment/Plan:   Zachariah Lacey is a 64 y.o.male who comes in today for postoperative check after excision of upper right back mass with Dr. Ling on 04/08/2025.    Pathology: Reviewed with patient, all questions answered.   Final Diagnosis   A. Soft tissue mass, upper back (excision):  - Mature fibroadipose tissue, consistent with lipoma        All questions asked and answered.   ______________________________________________________  HPI:  Zachariah Lacey is a 64 y.o.male who comes in today for postoperative check after recent excision of upper right back mass with Dr. Ling on 04/08/2025.    Currently doing well without problems, no fever or chills,no nausea and no vomiting.      ROS:  General ROS: negative for - chills, fatigue, fever or night sweats, weight loss  Respiratory ROS: no cough, shortness of breath, or wheezing  Cardiovascular ROS: no chest pain or dyspnea on exertion  Genito-Urinary ROS: no dysuria, trouble voiding, or hematuria  Musculoskeletal ROS: negative for - gait disturbance, joint pain or muscle pain  Neurological ROS: no TIA or stroke symptoms  GI ROS: see HPI  Skin ROS: no new rashes or lesions   Lymphatic ROS: no new adenopathy noted by pt.   GYN ROS: see HPI, no new GYN history or bleeding noted  Psy ROS: no new mental or behavioral disturbances         Patient Active Problem List   Diagnosis    Hiatal hernia    Hypertension    Insomnia    Irritable bowel syndrome    Nephrolithiasis    Chronic pain of both shoulders    Hyperlipidemia    Systolic murmur    Dizzy spells    Angina pectoris (HCC)    Constipation    Hemorrhoid    Memory changes    Lipoma of torso       Allergies:  Elavil [amitriptyline]      Current Outpatient Medications:     acetaminophen (Tylenol 8 Hour) 650 mg CR tablet, Take 1 tablet (650 mg total) by mouth every 8 (eight) hours as needed for mild pain, Disp: 30 tablet, Rfl: 0    amLODIPine (NORVASC) 10 mg tablet, Take 1 tablet (10 mg total) by mouth daily, Disp: 90  tablet, Rfl: 1    atorvastatin (LIPITOR) 20 mg tablet, TOME 1 TABLETA POR VIA ORAL TODOS LOS SANDY, Disp: 30 tablet, Rfl: 5    brompheniramine-pseudoephedrine-DM 30-2-10 MG/5ML syrup, Take 5 mL by mouth 4 (four) times a day as needed for congestion, cough or allergies, Disp: 120 mL, Rfl: 3    clotrimazole-betamethasone (LOTRISONE) 1-0.05 % cream, Apply topically 2 (two) times a day, Disp: 45 g, Rfl: 3    diclofenac (VOLTAREN) 50 mg EC tablet, Take 1 tablet (50 mg total) by mouth 3 (three) times a day, Disp: 90 tablet, Rfl: 3    docusate sodium (COLACE) 100 mg capsule, Take 1 capsule (100 mg total) by mouth in the morning, Disp: 90 capsule, Rfl: 3    hydrocortisone (ANUSOL-HC) 2.5 % rectal cream, Apply topically 2 (two) times a day, Disp: 28 g, Rfl: 1    hydrOXYzine pamoate (VISTARIL) 25 mg capsule, Take 25 mg by mouth, Disp: , Rfl:     methocarbamol (ROBAXIN) 500 mg tablet, Take 1 tablet (500 mg total) by mouth daily at bedtime as needed for muscle spasms, Disp: 90 tablet, Rfl: 0    mirtazapine (REMERON) 7.5 MG tablet, Take 1 tablet (7.5 mg total) by mouth daily at bedtime, Disp: 90 tablet, Rfl: 0    valsartan (DIOVAN) 160 mg tablet, Take 1 tablet (160 mg total) by mouth daily, Disp: 90 tablet, Rfl: 1    Past Medical History:   Diagnosis Date    Anxiety     last assessed 03/29/2017    Chest pain 3/26/2021    COVID-19 11/16/2020 11/12/2020- symptom onset with sore throat, fever, congestion, headache.    Diverticulitis of colon     last assessed 08/14/2012    Hematuria 11/13/2014    Description: 6 mm right-sided kidney stone    Heme positive stool 11/10/2014    Description: Referred to Dr. Rudy Laughlin 11/10/14    Hiatal hernia     last assessed 08/14/2012    IBS (irritable bowel syndrome)     last assessed 08/14/2012    Multiple joint pain 4/19/2018    Right shoulder pain 12/8/2014    Tinea corporis 2/6/2020       Past Surgical History:   Procedure Laterality Date    APPENDECTOMY      GALLBLADDER SURGERY      WA EXC  "B9 LESION MRGN XCP SK TG T/A/L 2.1-3.0 CM Right 4/8/2025    Procedure: EXCISION  BIOPSY LESION/MASS upper BACK;  Surgeon: Santi Ling DO;  Location:  MAIN OR;  Service: General       Family History   Problem Relation Age of Onset    Alcohol abuse Father         reports that he has never smoked. He has never used smokeless tobacco. He reports that he does not drink alcohol and does not use drugs.    There were no vitals filed for this visit.    PHYSICAL EXAM  General: normal, cooperative, no distress    Incision: clean, dry, and intact and healing well    Some portions of this record may have been generated with voice recognition software. There may be translation, syntax,  or grammatical errors. Occasional wrong word or \"sound-a-like\" substitutions may have occurred due to the inherent limitations of the voice recognition software. Read the chart carefully and recognize, using context, where substitutions may have occurred. If you have any questions, please contact the dictating provider for clarification or correction, as needed. This encounter has been coded by a non-certified coder.       KORINA Delacruz    Date: 4/16/2025 Time: 8:44 AM    "

## 2025-04-22 ENCOUNTER — OFFICE VISIT (OUTPATIENT)
Dept: SURGERY | Facility: CLINIC | Age: 65
End: 2025-04-22

## 2025-04-22 VITALS
SYSTOLIC BLOOD PRESSURE: 140 MMHG | HEIGHT: 65 IN | OXYGEN SATURATION: 97 % | HEART RATE: 82 BPM | TEMPERATURE: 97 F | WEIGHT: 186.6 LBS | RESPIRATION RATE: 16 BRPM | BODY MASS INDEX: 31.09 KG/M2 | DIASTOLIC BLOOD PRESSURE: 80 MMHG

## 2025-04-22 DIAGNOSIS — R22.2 MASS OF SUBCUTANEOUS TISSUE OF BACK: Primary | ICD-10-CM

## 2025-04-28 DIAGNOSIS — M25.512 CHRONIC PAIN OF BOTH SHOULDERS: ICD-10-CM

## 2025-04-28 DIAGNOSIS — G89.29 CHRONIC PAIN OF BOTH SHOULDERS: ICD-10-CM

## 2025-04-28 DIAGNOSIS — M25.511 CHRONIC PAIN OF BOTH SHOULDERS: ICD-10-CM

## 2025-05-09 DIAGNOSIS — G89.29 CHRONIC PAIN OF BOTH SHOULDERS: ICD-10-CM

## 2025-05-09 DIAGNOSIS — M25.512 CHRONIC PAIN OF BOTH SHOULDERS: ICD-10-CM

## 2025-05-09 DIAGNOSIS — M25.511 CHRONIC PAIN OF BOTH SHOULDERS: ICD-10-CM

## 2025-05-09 NOTE — TELEPHONE ENCOUNTER
Requested Prescriptions     Pending Prescriptions Disp Refills    methocarbamol (ROBAXIN) 500 mg tablet [Pharmacy Med Name: METHOCARBAMOL 500 MG TABLET] 90 tablet 0     Sig: TAKE 1 TABLET (500 MG TOTAL) BY MOUTH DAILY AT BEDTIME AS NEEDED FOR MUSCLE SPASMS

## 2025-05-10 RX ORDER — METHOCARBAMOL 500 MG/1
500 TABLET, FILM COATED ORAL
Qty: 90 TABLET | Refills: 0 | Status: SHIPPED | OUTPATIENT
Start: 2025-05-10

## 2025-07-03 DIAGNOSIS — G47.00 INSOMNIA, UNSPECIFIED TYPE: ICD-10-CM

## 2025-07-03 RX ORDER — MIRTAZAPINE 7.5 MG/1
7.5 TABLET, FILM COATED ORAL
Qty: 30 TABLET | Refills: 0 | Status: SHIPPED | OUTPATIENT
Start: 2025-07-03

## 2025-07-21 ENCOUNTER — OFFICE VISIT (OUTPATIENT)
Dept: FAMILY MEDICINE CLINIC | Facility: CLINIC | Age: 65
End: 2025-07-21
Payer: COMMERCIAL

## 2025-07-21 ENCOUNTER — TELEPHONE (OUTPATIENT)
Age: 65
End: 2025-07-21

## 2025-07-21 VITALS
TEMPERATURE: 98 F | RESPIRATION RATE: 16 BRPM | HEART RATE: 71 BPM | WEIGHT: 190 LBS | BODY MASS INDEX: 31.65 KG/M2 | HEIGHT: 65 IN | OXYGEN SATURATION: 97 % | SYSTOLIC BLOOD PRESSURE: 128 MMHG | DIASTOLIC BLOOD PRESSURE: 70 MMHG

## 2025-07-21 DIAGNOSIS — E78.2 MIXED HYPERLIPIDEMIA: ICD-10-CM

## 2025-07-21 DIAGNOSIS — B35.6 JOCK ITCH: ICD-10-CM

## 2025-07-21 DIAGNOSIS — M75.121 NONTRAUMATIC COMPLETE TEAR OF RIGHT ROTATOR CUFF: ICD-10-CM

## 2025-07-21 DIAGNOSIS — D17.1 LIPOMA OF TORSO: ICD-10-CM

## 2025-07-21 DIAGNOSIS — G47.00 INSOMNIA, UNSPECIFIED TYPE: ICD-10-CM

## 2025-07-21 DIAGNOSIS — M25.511 CHRONIC PAIN OF BOTH SHOULDERS: Primary | ICD-10-CM

## 2025-07-21 DIAGNOSIS — M25.512 CHRONIC PAIN OF BOTH SHOULDERS: Primary | ICD-10-CM

## 2025-07-21 DIAGNOSIS — G89.29 CHRONIC PAIN OF BOTH SHOULDERS: Primary | ICD-10-CM

## 2025-07-21 PROCEDURE — 99214 OFFICE O/P EST MOD 30 MIN: CPT | Performed by: NURSE PRACTITIONER

## 2025-07-21 RX ORDER — NABUMETONE 500 MG/1
500 TABLET, FILM COATED ORAL 2 TIMES DAILY
Qty: 60 TABLET | Refills: 3 | Status: SHIPPED | OUTPATIENT
Start: 2025-07-21

## 2025-07-21 RX ORDER — ATORVASTATIN CALCIUM 20 MG/1
20 TABLET, FILM COATED ORAL DAILY
Qty: 30 TABLET | Refills: 5 | Status: SHIPPED | OUTPATIENT
Start: 2025-07-21

## 2025-07-21 RX ORDER — MIRTAZAPINE 15 MG/1
15 TABLET, FILM COATED ORAL
Qty: 30 TABLET | Refills: 0 | Status: SHIPPED | OUTPATIENT
Start: 2025-07-21

## 2025-07-21 RX ORDER — LIDOCAINE 50 MG/G
1 PATCH TOPICAL DAILY
Qty: 30 PATCH | Refills: 0 | Status: SHIPPED | OUTPATIENT
Start: 2025-07-21

## 2025-07-21 RX ORDER — CLOTRIMAZOLE AND BETAMETHASONE DIPROPIONATE 10; .64 MG/G; MG/G
CREAM TOPICAL 2 TIMES DAILY
Qty: 45 G | Refills: 3 | Status: SHIPPED | OUTPATIENT
Start: 2025-07-21

## 2025-07-21 NOTE — ASSESSMENT & PLAN NOTE
Increase remeron to 15mg nightly   Orders:  •  mirtazapine (REMERON) 15 mg tablet; Take 1 tablet (15 mg total) by mouth daily at bedtime

## 2025-07-21 NOTE — PROGRESS NOTES
Name: Zachariah Lacey      : 1960      MRN: [id number]  Encounter Provider: KORINA Palomino  Encounter Date: 2025   Encounter department: [facility] PR[facility]  :  Assessment & Plan  Jock itch    Orders:  •  clotrimazole-betamethasone (LOTRISONE) 1-0.05 % cream; Apply topically 2 (two) times a day    Mixed hyperlipidemia  Patient advised he needs to complete his blood work   Currently on atorvastatin 20mg     Orders:  •  atorvastatin (LIPITOR) 20 mg tablet; Take 1 tablet (20 mg total) by mouth daily    Insomnia, unspecified type  Increase remeron to 15mg nightly   Orders:  •  mirtazapine (REMERON) 15 mg tablet; Take 1 tablet (15 mg total) by mouth daily at bedtime    Chronic pain of both shoulders  Patient is following with ortho  Change diclofenac to nabumetone BID  Recommend lidocaine patches for the shoulders   Had right shoulder surgery already   Possibly needs left shoulder surgery attending physical therapy     Orders:  •  lidocaine (Lidoderm) 5 %; Apply 1 patch topically daily over 12 hours Remove & Discard patch within 12 hours or as directed by MD  •  nabumetone (RELAFEN) 500 mg tablet; Take 1 tablet (500 mg total) by mouth 2 (two) times a day    Nontraumatic complete tear of right rotator cuff  Had arthroscopy surgery to repair  Following with ortho   In therapy   Orders:  •  lidocaine (Lidoderm) 5 %; Apply 1 patch topically daily over 12 hours Remove & Discard patch within 12 hours or as directed by MD    Lipoma of torso  Patient underwent excision and no longer an issues              Depression Screening and Follow-up Plan: Patient's depression screening was positive with a PHQ-2 score of 3. Their PHQ-9 score was 7.   Clincally patient does not have depression. No treatment is required.       History of Present Illness   Patient presents today for followup     Right arm surgery reports this was tough recovery   He having left shoulder pain. Has follow up and reports they are  "going to schedule   He is going to physical therapy    Reports that he still has ongoing rash on and off needs more cream     Also reporting needs medications to help sleep       Review of Systems   Musculoskeletal:  Positive for arthralgias.   Psychiatric/Behavioral:  Positive for sleep disturbance.        Objective   /70 (BP Location: Left arm, Patient Position: Sitting, Cuff Size: Adult)   Pulse 71   Temp 98 °F (36.7 °C) (Tympanic)   Resp 16   Ht 5' 5\" (1.651 m)   Wt 86.2 kg (190 lb)   SpO2 97%   BMI 31.62 kg/m²      Physical Exam  Vitals and nursing note reviewed.   Constitutional:       Appearance: Normal appearance.     Cardiovascular:      Rate and Rhythm: Regular rhythm.      Heart sounds: S1 normal and S2 normal.   Pulmonary:      Effort: Pulmonary effort is normal.      Breath sounds: Normal breath sounds. No wheezing or rhonchi.     Musculoskeletal:      Right shoulder: No tenderness or bony tenderness. Decreased range of motion.      Left shoulder: No tenderness or bony tenderness. Normal range of motion.         "

## 2025-07-21 NOTE — TELEPHONE ENCOUNTER
PA for lidocaine 5% DENIED    Reason:    Message sent to office clinical pool Yes    Denial letter scanned into [employer] Yes    We can gladly do an appeal but the process can take about 30-60 days to provide determination. Please have the office staff schedule a Peer to Peer at phone [phone] . If an appeal is truly warranted please have Provider send clinical documentation to the PA department to support the appeal.     **Please follow up with your patient regarding denial and next steps**

## 2025-07-21 NOTE — ASSESSMENT & PLAN NOTE
Patient is following with ortho  Change diclofenac to nabumetone BID  Recommend lidocaine patches for the shoulders   Had right shoulder surgery already   Possibly needs left shoulder surgery attending physical therapy     Orders:  •  lidocaine (Lidoderm) 5 %; Apply 1 patch topically daily over 12 hours Remove & Discard patch within 12 hours or as directed by MD  •  nabumetone (RELAFEN) 500 mg tablet; Take 1 tablet (500 mg total) by mouth 2 (two) times a day

## 2025-07-21 NOTE — ASSESSMENT & PLAN NOTE
Had arthroscopy surgery to repair  Following with ortho   In therapy   Orders:  •  lidocaine (Lidoderm) 5 %; Apply 1 patch topically daily over 12 hours Remove & Discard patch within 12 hours or as directed by MD

## 2025-07-21 NOTE — ASSESSMENT & PLAN NOTE
Patient advised he needs to complete his blood work   Currently on atorvastatin 20mg     Orders:  •  atorvastatin (LIPITOR) 20 mg tablet; Take 1 tablet (20 mg total) by mouth daily

## 2025-07-21 NOTE — TELEPHONE ENCOUNTER
PA for lidocaine 5% SUBMITTED to [employer]    via    [x]CMM-KEY: [id number]  []Surescripts-Case ID #   []Availity-Auth ID # NDC #   []Faxed to plan   []Other website   []Phone call Case ID #     [x]PA sent as URGENT    All office notes, labs and other pertaining documents and studies sent. Clinical questions answered. Awaiting determination from insurance company.     Turnaround time for your insurance to make a decision on your Prior Authorization can take 7-21 business days.

## 2025-07-31 ENCOUNTER — CLINICAL SUPPORT (OUTPATIENT)
Dept: FAMILY MEDICINE CLINIC | Facility: CLINIC | Age: 65
End: 2025-07-31

## 2025-07-31 VITALS — OXYGEN SATURATION: 100 % | DIASTOLIC BLOOD PRESSURE: 76 MMHG | SYSTOLIC BLOOD PRESSURE: 120 MMHG | HEART RATE: 70 BPM

## 2025-07-31 DIAGNOSIS — I10 PRIMARY HYPERTENSION: Primary | ICD-10-CM

## (undated) DEVICE — USED IN CONJUNCTION WITH A SYRINGE AS AN ADDITIVE DEVICE FOR ASPIRATION FROM MULTI-DOSE MEDICINE VIALS OR INJECTION INTO I.V. SYSTEMS AND PRE-SLIT SEPTUMS COVERING INJECTION SITES.: Brand: SOL-M™ BLUNT FILL NEEDLE

## (undated) DEVICE — BETHLEHEM UNIVERSAL OUTPATIENT: Brand: CARDINAL HEALTH

## (undated) DEVICE — EXOFIN PRECISION PEN HIGH VISCOSITY TOPICAL SKIN ADHESIVE: Brand: EXOFIN PRECISION PEN, 1G

## (undated) DEVICE — REM POLYHESIVE ADULT PATIENT RETURN ELECTRODE: Brand: VALLEYLAB

## (undated) DEVICE — SYRINGE 30ML LL

## (undated) DEVICE — STERILE POLYISOPRENE POWDER-FREE SURGICAL GLOVES: Brand: PROTEXIS

## (undated) DEVICE — MINOR PROCEDURE DRAPE: Brand: CONVERTORS

## (undated) DEVICE — ANTIBACTERIAL UNDYED BRAIDED (POLYGLACTIN 910), SYNTHETIC ABSORBABLE SUTURE: Brand: COATED VICRYL

## (undated) DEVICE — SUT MONOCRYL 4-0 PS-2 27 IN Y426H

## (undated) DEVICE — NEPTUNE E-SEP SMOKE EVACUATION PENCIL, COATED, 70MM BLADE, PUSH BUTTON SWITCH: Brand: NEPTUNE E-SEP

## (undated) DEVICE — SCD SEQUENTIAL COMPRESSION COMFORT SLEEVE MEDIUM KNEE LENGTH: Brand: KENDALL SCD

## (undated) DEVICE — NEEDLE 25G X 1 1/2

## (undated) DEVICE — INTENDED FOR TISSUE SEPARATION, AND OTHER PROCEDURES THAT REQUIRE A SHARP SURGICAL BLADE TO PUNCTURE OR CUT.: Brand: BARD-PARKER SAFETY BLADES SIZE 15, STERILE